# Patient Record
Sex: MALE | Race: BLACK OR AFRICAN AMERICAN | NOT HISPANIC OR LATINO | Employment: FULL TIME | URBAN - METROPOLITAN AREA
[De-identification: names, ages, dates, MRNs, and addresses within clinical notes are randomized per-mention and may not be internally consistent; named-entity substitution may affect disease eponyms.]

---

## 2023-04-08 LAB
PROSTATE SPECIFIC AG (NG/ML) IN SER/PLAS: 2.2 NG/ML (ref 0–4)
PROSTATE SPECIFIC AG FREE (NG/ML) IN SER/PLAS: 0.8 NG/ML
PROSTATE SPECIFIC AG FREE/PROSTATE SPECIFIC AG TOTAL IN SER/PLAS: 36 %

## 2023-04-17 ENCOUNTER — OFFICE VISIT (OUTPATIENT)
Dept: PRIMARY CARE | Facility: CLINIC | Age: 68
End: 2023-04-17
Payer: MEDICARE

## 2023-04-17 DIAGNOSIS — R73.03 BORDERLINE TYPE 2 DIABETES MELLITUS: Primary | ICD-10-CM

## 2023-04-17 DIAGNOSIS — I10 HYPERTENSION, UNSPECIFIED TYPE: ICD-10-CM

## 2023-04-17 DIAGNOSIS — E78.2 HYPERLIPIDEMIA, MIXED: ICD-10-CM

## 2023-04-17 PROCEDURE — 82465 ASSAY BLD/SERUM CHOLESTEROL: CPT

## 2023-04-17 PROCEDURE — 1170F FXNL STATUS ASSESSED: CPT | Performed by: INTERNAL MEDICINE

## 2023-04-17 PROCEDURE — 99213 OFFICE O/P EST LOW 20 MIN: CPT | Performed by: INTERNAL MEDICINE

## 2023-04-17 PROCEDURE — 83036 HEMOGLOBIN GLYCOSYLATED A1C: CPT

## 2023-04-17 PROCEDURE — 36415 COLL VENOUS BLD VENIPUNCTURE: CPT | Performed by: INTERNAL MEDICINE

## 2023-04-17 PROCEDURE — 1159F MED LIST DOCD IN RCRD: CPT | Performed by: INTERNAL MEDICINE

## 2023-04-17 PROCEDURE — 83718 ASSAY OF LIPOPROTEIN: CPT

## 2023-04-17 PROCEDURE — 1160F RVW MEDS BY RX/DR IN RCRD: CPT | Performed by: INTERNAL MEDICINE

## 2023-04-17 PROCEDURE — 1036F TOBACCO NON-USER: CPT | Performed by: INTERNAL MEDICINE

## 2023-04-17 PROCEDURE — 80053 COMPREHEN METABOLIC PANEL: CPT

## 2023-04-17 RX ORDER — ATORVASTATIN CALCIUM 20 MG/1
20 TABLET, FILM COATED ORAL DAILY
COMMUNITY
Start: 2022-09-29 | End: 2023-09-05

## 2023-04-17 RX ORDER — LISINOPRIL AND HYDROCHLOROTHIAZIDE 20; 25 MG/1; MG/1
1 TABLET ORAL DAILY
COMMUNITY
Start: 2016-08-11 | End: 2023-10-22

## 2023-04-17 RX ORDER — ASPIRIN 81 MG/1
81 TABLET ORAL DAILY
COMMUNITY
Start: 2022-08-03 | End: 2023-11-07

## 2023-04-17 RX ORDER — TRAVOPROST OPHTHALMIC SOLUTION 0.04 MG/ML
1 SOLUTION OPHTHALMIC NIGHTLY
COMMUNITY
Start: 2023-03-01

## 2023-04-17 SDOH — ECONOMIC STABILITY: FOOD INSECURITY: WITHIN THE PAST 12 MONTHS, THE FOOD YOU BOUGHT JUST DIDN'T LAST AND YOU DIDN'T HAVE MONEY TO GET MORE.: NEVER TRUE

## 2023-04-17 SDOH — ECONOMIC STABILITY: FOOD INSECURITY: WITHIN THE PAST 12 MONTHS, YOU WORRIED THAT YOUR FOOD WOULD RUN OUT BEFORE YOU GOT MONEY TO BUY MORE.: NEVER TRUE

## 2023-04-17 SDOH — ECONOMIC STABILITY: TRANSPORTATION INSECURITY
IN THE PAST 12 MONTHS, HAS THE LACK OF TRANSPORTATION KEPT YOU FROM MEDICAL APPOINTMENTS OR FROM GETTING MEDICATIONS?: NO

## 2023-04-17 SDOH — ECONOMIC STABILITY: INCOME INSECURITY: IN THE LAST 12 MONTHS, WAS THERE A TIME WHEN YOU WERE NOT ABLE TO PAY THE MORTGAGE OR RENT ON TIME?: NO

## 2023-04-17 SDOH — ECONOMIC STABILITY: HOUSING INSECURITY
IN THE LAST 12 MONTHS, WAS THERE A TIME WHEN YOU DID NOT HAVE A STEADY PLACE TO SLEEP OR SLEPT IN A SHELTER (INCLUDING NOW)?: NO

## 2023-04-17 SDOH — HEALTH STABILITY: PHYSICAL HEALTH: ON AVERAGE, HOW MANY DAYS PER WEEK DO YOU ENGAGE IN MODERATE TO STRENUOUS EXERCISE (LIKE A BRISK WALK)?: 3 DAYS

## 2023-04-17 SDOH — ECONOMIC STABILITY: TRANSPORTATION INSECURITY
IN THE PAST 12 MONTHS, HAS LACK OF TRANSPORTATION KEPT YOU FROM MEETINGS, WORK, OR FROM GETTING THINGS NEEDED FOR DAILY LIVING?: NO

## 2023-04-17 SDOH — HEALTH STABILITY: PHYSICAL HEALTH: ON AVERAGE, HOW MANY MINUTES DO YOU ENGAGE IN EXERCISE AT THIS LEVEL?: 40 MIN

## 2023-04-17 ASSESSMENT — ACTIVITIES OF DAILY LIVING (ADL)
WALKS IN HOME: INDEPENDENT
HEARING - LEFT EAR: FUNCTIONAL
ADEQUATE_TO_COMPLETE_ADL: YES
GROOMING: INDEPENDENT
TOILETING: INDEPENDENT
DRESSING YOURSELF: INDEPENDENT
JUDGMENT_ADEQUATE_SAFELY_COMPLETE_DAILY_ACTIVITIES: YES
PATIENT'S MEMORY ADEQUATE TO SAFELY COMPLETE DAILY ACTIVITIES?: YES
HEARING - RIGHT EAR: FUNCTIONAL
FEEDING YOURSELF: INDEPENDENT
BATHING: INDEPENDENT

## 2023-04-17 ASSESSMENT — ENCOUNTER SYMPTOMS
MYALGIAS: 0
ACTIVITY CHANGE: 0
SINUS PRESSURE: 0
WEAKNESS: 0
SHORTNESS OF BREATH: 0
PALPITATIONS: 0
CHILLS: 0
CHEST TIGHTNESS: 0
OCCASIONAL FEELINGS OF UNSTEADINESS: 0
DEPRESSION: 0
DIARRHEA: 0
AGITATION: 0
SORE THROAT: 0
LOSS OF SENSATION IN FEET: 0
NAUSEA: 0

## 2023-04-17 ASSESSMENT — ANXIETY QUESTIONNAIRES
4. TROUBLE RELAXING: NOT AT ALL
7. FEELING AFRAID AS IF SOMETHING AWFUL MIGHT HAPPEN: NOT AT ALL
5. BEING SO RESTLESS THAT IT IS HARD TO SIT STILL: NOT AT ALL
1. FEELING NERVOUS, ANXIOUS, OR ON EDGE: NOT AT ALL
IF YOU CHECKED OFF ANY PROBLEMS ON THIS QUESTIONNAIRE, HOW DIFFICULT HAVE THESE PROBLEMS MADE IT FOR YOU TO DO YOUR WORK, TAKE CARE OF THINGS AT HOME, OR GET ALONG WITH OTHER PEOPLE: NOT DIFFICULT AT ALL
GAD7 TOTAL SCORE: 0
3. WORRYING TOO MUCH ABOUT DIFFERENT THINGS: NOT AT ALL
6. BECOMING EASILY ANNOYED OR IRRITABLE: NOT AT ALL
2. NOT BEING ABLE TO STOP OR CONTROL WORRYING: NOT AT ALL

## 2023-04-17 ASSESSMENT — SOCIAL DETERMINANTS OF HEALTH (SDOH)
DO YOU BELONG TO ANY CLUBS OR ORGANIZATIONS SUCH AS CHURCH GROUPS UNIONS, FRATERNAL OR ATHLETIC GROUPS, OR SCHOOL GROUPS?: PATIENT DECLINED
HOW OFTEN DO YOU ATTENT MEETINGS OF THE CLUB OR ORGANIZATION YOU BELONG TO?: PATIENT DECLINED
HOW OFTEN DO YOU GET TOGETHER WITH FRIENDS OR RELATIVES?: THREE TIMES A WEEK
HOW HARD IS IT FOR YOU TO PAY FOR THE VERY BASICS LIKE FOOD, HOUSING, MEDICAL CARE, AND HEATING?: NOT VERY HARD
ARE YOU MARRIED, WIDOWED, DIVORCED, SEPARATED, NEVER MARRIED, OR LIVING WITH A PARTNER?: PATIENT DECLINED
WITHIN THE LAST YEAR, HAVE TO BEEN RAPED OR FORCED TO HAVE ANY KIND OF SEXUAL ACTIVITY BY YOUR PARTNER OR EX-PARTNER?: NO
WITHIN THE LAST YEAR, HAVE YOU BEEN KICKED, HIT, SLAPPED, OR OTHERWISE PHYSICALLY HURT BY YOUR PARTNER OR EX-PARTNER?: NO
WITHIN THE LAST YEAR, HAVE YOU BEEN AFRAID OF YOUR PARTNER OR EX-PARTNER?: NO
IN A TYPICAL WEEK, HOW MANY TIMES DO YOU TALK ON THE PHONE WITH FAMILY, FRIENDS, OR NEIGHBORS?: THREE TIMES A WEEK
HOW OFTEN DO YOU ATTEND CHURCH OR RELIGIOUS SERVICES?: 1 TO 4 TIMES PER YEAR
WITHIN THE LAST YEAR, HAVE YOU BEEN HUMILIATED OR EMOTIONALLY ABUSED IN OTHER WAYS BY YOUR PARTNER OR EX-PARTNER?: NO

## 2023-04-17 ASSESSMENT — LIFESTYLE VARIABLES
AUDIT-C TOTAL SCORE: 0
HOW OFTEN DO YOU HAVE SIX OR MORE DRINKS ON ONE OCCASION: NEVER
HOW OFTEN DO YOU HAVE A DRINK CONTAINING ALCOHOL: NEVER
SKIP TO QUESTIONS 9-10: 1
HOW MANY STANDARD DRINKS CONTAINING ALCOHOL DO YOU HAVE ON A TYPICAL DAY: PATIENT DOES NOT DRINK

## 2023-04-17 ASSESSMENT — COLUMBIA-SUICIDE SEVERITY RATING SCALE - C-SSRS
6. HAVE YOU EVER DONE ANYTHING, STARTED TO DO ANYTHING, OR PREPARED TO DO ANYTHING TO END YOUR LIFE?: NO
2. HAVE YOU ACTUALLY HAD ANY THOUGHTS OF KILLING YOURSELF?: NO
1. IN THE PAST MONTH, HAVE YOU WISHED YOU WERE DEAD OR WISHED YOU COULD GO TO SLEEP AND NOT WAKE UP?: NO

## 2023-04-17 ASSESSMENT — PATIENT HEALTH QUESTIONNAIRE - PHQ9
1. LITTLE INTEREST OR PLEASURE IN DOING THINGS: NOT AT ALL
SUM OF ALL RESPONSES TO PHQ9 QUESTIONS 1 & 2: 0
2. FEELING DOWN, DEPRESSED OR HOPELESS: NOT AT ALL

## 2023-04-17 ASSESSMENT — PAIN SCALES - GENERAL: PAINLEVEL: 0-NO PAIN

## 2023-04-17 NOTE — PROGRESS NOTES
Subjective   Patient ID: Ariel Howard Jr. is a 67 y.o. male who presents for Follow-up.  Feels well on the medication.  He has been told that he had borderline diabetes so he wants the HbA1c checked again.  Watching his diet closely.      HPI     Review of Systems   Constitutional:  Negative for activity change and chills.   HENT:  Negative for congestion, sinus pressure and sore throat.    Respiratory:  Negative for chest tightness and shortness of breath.    Cardiovascular:  Negative for chest pain and palpitations.   Gastrointestinal:  Negative for diarrhea and nausea.   Musculoskeletal:  Negative for myalgias.   Neurological:  Negative for weakness.   Psychiatric/Behavioral:  Negative for agitation and behavioral problems.        Objective   BP (P) 138/82 (BP Location: Left arm, Patient Position: Sitting, BP Cuff Size: Large adult)   Pulse (P) 84   Temp (P) 36.6 °C (97.9 °F) (Temporal)   Resp (P) 18   SpO2 (P) 98%     Physical Exam  Constitutional:       Appearance: Normal appearance.   HENT:      Head: Normocephalic and atraumatic.      Nose: Nose normal.      Mouth/Throat:      Mouth: Mucous membranes are moist.   Eyes:      Extraocular Movements: Extraocular movements intact.      Pupils: Pupils are equal, round, and reactive to light.   Cardiovascular:      Rate and Rhythm: Normal rate and regular rhythm.   Pulmonary:      Effort: No respiratory distress.      Breath sounds: No wheezing.   Abdominal:      General: Bowel sounds are normal.      Palpations: Abdomen is soft.   Neurological:      General: No focal deficit present.       Assessment/Plan   Problem List Items Addressed This Visit          Circulatory    Hypertension    Relevant Orders    Comprehensive Metabolic Panel       Other    Hyperlipidemia, mixed    Relevant Orders    Lipid Panel Non-Fasting     Other Visit Diagnoses       Borderline type 2 diabetes mellitus    -  Primary    Relevant Orders    Hemoglobin A1C    Comprehensive  Metabolic Panel        Doing well overall.       Dictation #1  MRN:[id number]  CSN:[id number]

## 2023-04-18 LAB
ALANINE AMINOTRANSFERASE (SGPT) (U/L) IN SER/PLAS: 9 U/L (ref 10–52)
ALBUMIN (G/DL) IN SER/PLAS: 4.1 G/DL (ref 3.4–5)
ALKALINE PHOSPHATASE (U/L) IN SER/PLAS: 51 U/L (ref 33–136)
ANION GAP IN SER/PLAS: 13 MMOL/L (ref 10–20)
ASPARTATE AMINOTRANSFERASE (SGOT) (U/L) IN SER/PLAS: 14 U/L (ref 9–39)
BILIRUBIN TOTAL (MG/DL) IN SER/PLAS: 0.5 MG/DL (ref 0–1.2)
CALCIUM (MG/DL) IN SER/PLAS: 9.6 MG/DL (ref 8.6–10.6)
CARBON DIOXIDE, TOTAL (MMOL/L) IN SER/PLAS: 27 MMOL/L (ref 21–32)
CHLORIDE (MMOL/L) IN SER/PLAS: 105 MMOL/L (ref 98–107)
CHOLESTEROL (MG/DL) IN SER/PLAS: 129 MG/DL (ref 0–199)
CHOLESTEROL IN HDL (MG/DL) IN SER/PLAS: 31.3 MG/DL
CHOLESTEROL/HDL RATIO: 4.1
CREATININE (MG/DL) IN SER/PLAS: 1.2 MG/DL (ref 0.5–1.3)
ESTIMATED AVERAGE GLUCOSE FOR HBA1C: 120 MG/DL
GFR MALE: 66 ML/MIN/1.73M2
GLUCOSE (MG/DL) IN SER/PLAS: 99 MG/DL (ref 74–99)
HEMOGLOBIN A1C/HEMOGLOBIN TOTAL IN BLOOD: 5.8 %
NON-HDL CHOLESTEROL: 98 MG/DL
POTASSIUM (MMOL/L) IN SER/PLAS: 3.5 MMOL/L (ref 3.5–5.3)
PROTEIN TOTAL: 7.2 G/DL (ref 6.4–8.2)
SODIUM (MMOL/L) IN SER/PLAS: 141 MMOL/L (ref 136–145)
UREA NITROGEN (MG/DL) IN SER/PLAS: 18 MG/DL (ref 6–23)

## 2023-09-03 DIAGNOSIS — E78.5 HYPERLIPIDEMIA, UNSPECIFIED: ICD-10-CM

## 2023-09-05 RX ORDER — ATORVASTATIN CALCIUM 20 MG/1
20 TABLET, FILM COATED ORAL DAILY
Qty: 90 TABLET | Refills: 3 | Status: SHIPPED | OUTPATIENT
Start: 2023-09-05

## 2023-09-29 ENCOUNTER — TELEPHONE (OUTPATIENT)
Dept: PRIMARY CARE | Facility: CLINIC | Age: 68
End: 2023-09-29
Payer: MEDICARE

## 2023-09-29 DIAGNOSIS — Z12.11 COLON CANCER SCREENING: Primary | ICD-10-CM

## 2023-09-29 NOTE — TELEPHONE ENCOUNTER
Patient states he needs a referral for 10 year colon screening. Please put referral in for GI consult.

## 2023-10-12 ENCOUNTER — LAB (OUTPATIENT)
Dept: LAB | Facility: LAB | Age: 68
End: 2023-10-12
Payer: MEDICARE

## 2023-10-12 DIAGNOSIS — R97.20 ELEVATED PROSTATE SPECIFIC ANTIGEN (PSA): Primary | ICD-10-CM

## 2023-10-12 DIAGNOSIS — R97.20 ELEVATED PROSTATE SPECIFIC ANTIGEN (PSA): ICD-10-CM

## 2023-10-12 LAB — PSA SERPL-MCNC: 1.81 NG/ML

## 2023-10-12 PROCEDURE — 84153 ASSAY OF PSA TOTAL: CPT

## 2023-10-12 PROCEDURE — 36415 COLL VENOUS BLD VENIPUNCTURE: CPT

## 2023-10-20 DIAGNOSIS — I10 ESSENTIAL (PRIMARY) HYPERTENSION: ICD-10-CM

## 2023-10-22 RX ORDER — LISINOPRIL AND HYDROCHLOROTHIAZIDE 20; 25 MG/1; MG/1
1 TABLET ORAL DAILY
Qty: 90 TABLET | Refills: 3 | Status: SHIPPED | OUTPATIENT
Start: 2023-10-22

## 2023-10-25 PROBLEM — L98.9 SKIN LESION: Status: ACTIVE | Noted: 2023-10-25

## 2023-10-25 PROBLEM — R73.9 HYPERGLYCEMIA: Status: ACTIVE | Noted: 2023-10-25

## 2023-10-25 PROBLEM — U07.1 DISEASE DUE TO SEVERE ACUTE RESPIRATORY SYNDROME CORONAVIRUS 2 (SARS-COV-2): Status: ACTIVE | Noted: 2023-10-25

## 2023-10-25 PROBLEM — D17.30 LIPOMA OF SKIN: Status: ACTIVE | Noted: 2023-10-25

## 2023-10-25 PROBLEM — R06.09 EXERTIONAL DYSPNEA: Status: ACTIVE | Noted: 2023-10-25

## 2023-10-25 PROBLEM — R94.31 ABNORMAL EKG: Status: ACTIVE | Noted: 2023-10-25

## 2023-10-25 PROBLEM — E87.6 HYPOKALEMIA: Status: ACTIVE | Noted: 2023-10-25

## 2023-10-25 PROBLEM — D17.1 LIPOMA OF TORSO: Status: ACTIVE | Noted: 2023-10-25

## 2023-10-25 PROBLEM — L30.9 ECZEMA: Status: ACTIVE | Noted: 2023-10-25

## 2023-10-25 PROBLEM — R97.20 ELEVATED PSA: Status: ACTIVE | Noted: 2023-10-25

## 2023-10-25 PROBLEM — I10 BENIGN ESSENTIAL HYPERTENSION: Status: ACTIVE | Noted: 2023-10-25

## 2023-10-25 PROBLEM — E78.5 DYSLIPIDEMIA: Status: ACTIVE | Noted: 2023-10-25

## 2023-10-25 PROBLEM — H61.23 EXCESSIVE CERUMEN IN BOTH EAR CANALS: Status: ACTIVE | Noted: 2023-10-25

## 2023-10-25 PROBLEM — E55.9 VITAMIN D DEFICIENCY: Status: ACTIVE | Noted: 2023-10-25

## 2023-10-25 PROBLEM — B02.9 SHINGLES: Status: ACTIVE | Noted: 2023-10-25

## 2023-10-25 PROBLEM — L85.3 DRY SKIN: Status: ACTIVE | Noted: 2023-10-25

## 2023-10-25 PROBLEM — R55 VASOVAGAL SYNCOPE: Status: ACTIVE | Noted: 2023-10-25

## 2023-10-25 PROBLEM — R39.89 ABNORMAL COMPLIANCE OF BLADDER: Status: ACTIVE | Noted: 2023-10-25

## 2023-10-26 ENCOUNTER — OFFICE VISIT (OUTPATIENT)
Dept: UROLOGY | Facility: HOSPITAL | Age: 68
End: 2023-10-26
Payer: MEDICARE

## 2023-10-26 DIAGNOSIS — R97.20 ELEVATED PSA: ICD-10-CM

## 2023-10-26 DIAGNOSIS — R39.89 ABNORMAL COMPLIANCE OF BLADDER: Primary | ICD-10-CM

## 2023-10-26 PROCEDURE — 1159F MED LIST DOCD IN RCRD: CPT | Performed by: UROLOGY

## 2023-10-26 PROCEDURE — 1036F TOBACCO NON-USER: CPT | Performed by: UROLOGY

## 2023-10-26 PROCEDURE — 1126F AMNT PAIN NOTED NONE PRSNT: CPT | Performed by: UROLOGY

## 2023-10-26 PROCEDURE — 1160F RVW MEDS BY RX/DR IN RCRD: CPT | Performed by: UROLOGY

## 2023-10-26 PROCEDURE — 99213 OFFICE O/P EST LOW 20 MIN: CPT | Performed by: UROLOGY

## 2023-10-26 NOTE — PROGRESS NOTES
Patient:   Ariel Howard Jr. is a 68 y.o. year old male patient here today for follow up and PSA results. Pt is unsure of PFHx.      Hx:   - Elevated PSA  - MRI on 8/5/2022 demonstrated an intermediate PI-RADS 3 lesion    Recent PSA and PSA panel:  - PSA was 1.81 on 10/12/23  - 2.2 total with 36% free on 4/5/23  - 2.0 total with 40% free on 9/6/22  PVR is 5 cc  - Results reviewed with patient. Patient reassured.     Patient concerns and symptoms:  Doing well, no complaints. Denies any dysuria, hematuria, bothersome urinary frequency, urgency, or flank pain. Patient denies any pushing or straining to urinate. He feels he is fully emptying his bladder.    Plan:  - Follow up in 6 months with PSA panel prior.   - All questions and concerns were addressed. Patient verbalizes understanding and has no other questions at this time.     Scribe Attestation:  By signing my name below, IMeagan Scribe attest that this documentation has been prepared under the direction and in the presence of Oumar Martinez MD.

## 2023-10-31 ENCOUNTER — OFFICE VISIT (OUTPATIENT)
Dept: PRIMARY CARE | Facility: CLINIC | Age: 68
End: 2023-10-31
Payer: MEDICARE

## 2023-10-31 ENCOUNTER — APPOINTMENT (OUTPATIENT)
Dept: GASTROENTEROLOGY | Facility: CLINIC | Age: 68
End: 2023-10-31
Payer: MEDICARE

## 2023-10-31 VITALS
DIASTOLIC BLOOD PRESSURE: 75 MMHG | RESPIRATION RATE: 18 BRPM | WEIGHT: 215.6 LBS | HEART RATE: 91 BPM | TEMPERATURE: 97.6 F | SYSTOLIC BLOOD PRESSURE: 147 MMHG | BODY MASS INDEX: 29.2 KG/M2 | HEIGHT: 72 IN | OXYGEN SATURATION: 98 %

## 2023-10-31 DIAGNOSIS — I10 PRIMARY HYPERTENSION: ICD-10-CM

## 2023-10-31 DIAGNOSIS — Z23 NEED FOR VACCINATION: ICD-10-CM

## 2023-10-31 DIAGNOSIS — Z12.11 COLON CANCER SCREENING: ICD-10-CM

## 2023-10-31 DIAGNOSIS — Z00.00 HEALTHCARE MAINTENANCE: Primary | ICD-10-CM

## 2023-10-31 PROCEDURE — 1170F FXNL STATUS ASSESSED: CPT | Performed by: INTERNAL MEDICINE

## 2023-10-31 PROCEDURE — 36415 COLL VENOUS BLD VENIPUNCTURE: CPT

## 2023-10-31 PROCEDURE — 3077F SYST BP >= 140 MM HG: CPT | Performed by: INTERNAL MEDICINE

## 2023-10-31 PROCEDURE — G0439 PPPS, SUBSEQ VISIT: HCPCS | Performed by: INTERNAL MEDICINE

## 2023-10-31 PROCEDURE — 80053 COMPREHEN METABOLIC PANEL: CPT

## 2023-10-31 PROCEDURE — 82465 ASSAY BLD/SERUM CHOLESTEROL: CPT

## 2023-10-31 PROCEDURE — G0008 ADMIN INFLUENZA VIRUS VAC: HCPCS | Performed by: INTERNAL MEDICINE

## 2023-10-31 PROCEDURE — 90662 IIV NO PRSV INCREASED AG IM: CPT | Performed by: INTERNAL MEDICINE

## 2023-10-31 PROCEDURE — 86803 HEPATITIS C AB TEST: CPT

## 2023-10-31 PROCEDURE — 83718 ASSAY OF LIPOPROTEIN: CPT

## 2023-10-31 PROCEDURE — 3078F DIAST BP <80 MM HG: CPT | Performed by: INTERNAL MEDICINE

## 2023-10-31 PROCEDURE — 1160F RVW MEDS BY RX/DR IN RCRD: CPT | Performed by: INTERNAL MEDICINE

## 2023-10-31 PROCEDURE — 83036 HEMOGLOBIN GLYCOSYLATED A1C: CPT

## 2023-10-31 PROCEDURE — 1036F TOBACCO NON-USER: CPT | Performed by: INTERNAL MEDICINE

## 2023-10-31 PROCEDURE — 1159F MED LIST DOCD IN RCRD: CPT | Performed by: INTERNAL MEDICINE

## 2023-10-31 PROCEDURE — 1126F AMNT PAIN NOTED NONE PRSNT: CPT | Performed by: INTERNAL MEDICINE

## 2023-10-31 ASSESSMENT — ACTIVITIES OF DAILY LIVING (ADL)
FEEDING YOURSELF: INDEPENDENT
PATIENT'S MEMORY ADEQUATE TO SAFELY COMPLETE DAILY ACTIVITIES?: YES
DRESSING YOURSELF: INDEPENDENT
DOING_HOUSEWORK: INDEPENDENT
HEARING - RIGHT EAR: FUNCTIONAL
GROCERY_SHOPPING: INDEPENDENT
JUDGMENT_ADEQUATE_SAFELY_COMPLETE_DAILY_ACTIVITIES: YES
GROOMING: INDEPENDENT
ADEQUATE_TO_COMPLETE_ADL: YES
TAKING_MEDICATION: INDEPENDENT
WALKS IN HOME: INDEPENDENT
MANAGING_FINANCES: INDEPENDENT
TOILETING: INDEPENDENT
BATHING: INDEPENDENT
HEARING - LEFT EAR: FUNCTIONAL

## 2023-10-31 ASSESSMENT — ENCOUNTER SYMPTOMS
MYALGIAS: 0
SINUS PRESSURE: 0
CHEST TIGHTNESS: 0
LOSS OF SENSATION IN FEET: 0
SHORTNESS OF BREATH: 0
WEAKNESS: 0
DEPRESSION: 0
NAUSEA: 0
AGITATION: 0
OCCASIONAL FEELINGS OF UNSTEADINESS: 0
CHILLS: 0
DIARRHEA: 0
ACTIVITY CHANGE: 0
SORE THROAT: 0
PALPITATIONS: 0

## 2023-10-31 ASSESSMENT — PATIENT HEALTH QUESTIONNAIRE - PHQ9
SUM OF ALL RESPONSES TO PHQ9 QUESTIONS 1 AND 2: 0
1. LITTLE INTEREST OR PLEASURE IN DOING THINGS: NOT AT ALL
2. FEELING DOWN, DEPRESSED OR HOPELESS: NOT AT ALL

## 2023-10-31 ASSESSMENT — PAIN SCALES - GENERAL: PAINLEVEL: 0-NO PAIN

## 2023-10-31 ASSESSMENT — COLUMBIA-SUICIDE SEVERITY RATING SCALE - C-SSRS
2. HAVE YOU ACTUALLY HAD ANY THOUGHTS OF KILLING YOURSELF?: NO
6. HAVE YOU EVER DONE ANYTHING, STARTED TO DO ANYTHING, OR PREPARED TO DO ANYTHING TO END YOUR LIFE?: NO
1. IN THE PAST MONTH, HAVE YOU WISHED YOU WERE DEAD OR WISHED YOU COULD GO TO SLEEP AND NOT WAKE UP?: NO

## 2023-10-31 NOTE — PROGRESS NOTES
Subjective   Reason for Visit: Ariel Howard Jr. is an 68 y.o. male here for a Medicare Wellness visit.  He senses a fullness below his scrotum.  He denies any problems with his urinary stream, fever, or any changes in his scrotum.  He simply feels a line between his rectum and his testicles.       Reviewed all medications by prescribing practitioner or clinical pharmacist (such as prescriptions, OTCs, herbal therapies and supplements) and documented in the medical record.    HPI    Patient Care Team:  Harjinder Keller MD as PCP - General (Internal Medicine)  EMERSON Frye as PCP - [employer] PCP     Review of Systems   Constitutional:  Negative for activity change and chills.   HENT:  Negative for congestion, sinus pressure and sore throat.    Respiratory:  Negative for chest tightness and shortness of breath.    Cardiovascular:  Negative for chest pain and palpitations.   Gastrointestinal:  Negative for diarrhea and nausea.   Musculoskeletal:  Negative for myalgias.   Neurological:  Negative for weakness.   Psychiatric/Behavioral:  Negative for agitation and behavioral problems.        Objective   Vitals:  /75 (BP Location: Left arm, Patient Position: Sitting, BP Cuff Size: Large adult)   Pulse 91   Temp 36.4 °C (97.6 °F) (Temporal)   Resp 18   Ht 1.829 m (6')   Wt 97.8 kg (215 lb 9.6 oz)   SpO2 98%   BMI 29.24 kg/m²       Physical Exam  Constitutional:       Appearance: Normal appearance.   HENT:      Head: Normocephalic and atraumatic.      Nose: Nose normal.      Mouth/Throat:      Mouth: Mucous membranes are moist.   Eyes:      Extraocular Movements: Extraocular movements intact.      Pupils: Pupils are equal, round, and reactive to light.   Cardiovascular:      Rate and Rhythm: Normal rate and regular rhythm.   Pulmonary:      Effort: No respiratory distress.      Breath sounds: No wheezing.   Abdominal:      General: Bowel sounds are normal.      Palpations: Abdomen is  soft.   Genitourinary:     Penis: Normal.       Testes: Normal.      Comments: No abnormalities.  Neurological:      General: No focal deficit present.   Psychiatric:         Mood and Affect: Mood normal.         Behavior: Behavior normal.         Assessment/Plan   Problem List Items Addressed This Visit       Hypertension    Relevant Orders    Comprehensive Metabolic Panel (Completed)    Lipid Panel Non-Fasting (Completed)    Healthcare maintenance - Primary    Relevant Orders    Hepatitis C Antibody (Completed)    Flu vaccine, quadrivalent, high-dose, preservative free, age 65y+ (FLUZONE) (Completed)    Hemoglobin A1C (Completed)     Other Visit Diagnoses       Colon cancer screening        Relevant Orders    Referral to Gastroenterology    Need for vaccination        Relevant Orders    Flu vaccine, quadrivalent, high-dose, preservative free, age 65y+ (FLUZONE) (Completed)        There is nothing to feel on that exam.  The patient was reassured.

## 2023-11-01 LAB
ALBUMIN SERPL BCP-MCNC: 4.4 G/DL (ref 3.4–5)
ALP SERPL-CCNC: 49 U/L (ref 33–136)
ALT SERPL W P-5'-P-CCNC: 10 U/L (ref 10–52)
ANION GAP SERPL CALC-SCNC: 13 MMOL/L (ref 10–20)
AST SERPL W P-5'-P-CCNC: 15 U/L (ref 9–39)
BILIRUB SERPL-MCNC: 0.7 MG/DL (ref 0–1.2)
BUN SERPL-MCNC: 13 MG/DL (ref 6–23)
CALCIUM SERPL-MCNC: 9.6 MG/DL (ref 8.6–10.6)
CHLORIDE SERPL-SCNC: 103 MMOL/L (ref 98–107)
CHOLEST SERPL-MCNC: 131 MG/DL (ref 0–199)
CHOLESTEROL/HDL RATIO: 4
CO2 SERPL-SCNC: 29 MMOL/L (ref 21–32)
CREAT SERPL-MCNC: 1.27 MG/DL (ref 0.5–1.3)
EST. AVERAGE GLUCOSE BLD GHB EST-MCNC: 114 MG/DL
GFR SERPL CREATININE-BSD FRML MDRD: 62 ML/MIN/1.73M*2
GLUCOSE SERPL-MCNC: 94 MG/DL (ref 74–99)
HBA1C MFR BLD: 5.6 %
HCV AB SER QL: NONREACTIVE
HDLC SERPL-MCNC: 32.5 MG/DL
NON-HDL CHOLESTEROL: 99 MG/DL (ref 0–149)
POTASSIUM SERPL-SCNC: 3.6 MMOL/L (ref 3.5–5.3)
PROT SERPL-MCNC: 7.5 G/DL (ref 6.4–8.2)
SODIUM SERPL-SCNC: 141 MMOL/L (ref 136–145)

## 2023-11-01 ASSESSMENT — PATIENT HEALTH QUESTIONNAIRE - PHQ9
2. FEELING DOWN, DEPRESSED OR HOPELESS: NOT AT ALL
1. LITTLE INTEREST OR PLEASURE IN DOING THINGS: NOT AT ALL
SUM OF ALL RESPONSES TO PHQ9 QUESTIONS 1 AND 2: 0

## 2023-11-01 ASSESSMENT — ACTIVITIES OF DAILY LIVING (ADL)
TAKING_MEDICATION: INDEPENDENT
DOING_HOUSEWORK: INDEPENDENT
DRESSING: INDEPENDENT
GROCERY_SHOPPING: INDEPENDENT
BATHING: INDEPENDENT
MANAGING_FINANCES: INDEPENDENT

## 2023-11-07 DIAGNOSIS — E78.5 HYPERLIPIDEMIA, UNSPECIFIED: ICD-10-CM

## 2023-11-07 RX ORDER — ASPIRIN 81 MG/1
81 TABLET ORAL DAILY
Qty: 90 TABLET | Refills: 3 | Status: SHIPPED | OUTPATIENT
Start: 2023-11-07

## 2023-11-17 ENCOUNTER — APPOINTMENT (OUTPATIENT)
Dept: GASTROENTEROLOGY | Facility: CLINIC | Age: 68
End: 2023-11-17
Payer: MEDICARE

## 2023-12-04 ENCOUNTER — OFFICE VISIT (OUTPATIENT)
Dept: GASTROENTEROLOGY | Facility: CLINIC | Age: 68
End: 2023-12-04
Payer: MEDICARE

## 2023-12-04 VITALS
WEIGHT: 215.28 LBS | TEMPERATURE: 97.5 F | BODY MASS INDEX: 29.2 KG/M2 | RESPIRATION RATE: 18 BRPM | SYSTOLIC BLOOD PRESSURE: 134 MMHG | HEART RATE: 97 BPM | DIASTOLIC BLOOD PRESSURE: 85 MMHG | OXYGEN SATURATION: 98 %

## 2023-12-04 DIAGNOSIS — Z12.11 COLON CANCER SCREENING: ICD-10-CM

## 2023-12-04 DIAGNOSIS — Z12.11 ENCOUNTER FOR SCREENING COLONOSCOPY: Primary | ICD-10-CM

## 2023-12-04 PROCEDURE — 3079F DIAST BP 80-89 MM HG: CPT | Performed by: NURSE PRACTITIONER

## 2023-12-04 PROCEDURE — 1126F AMNT PAIN NOTED NONE PRSNT: CPT | Performed by: NURSE PRACTITIONER

## 2023-12-04 PROCEDURE — 3075F SYST BP GE 130 - 139MM HG: CPT | Performed by: NURSE PRACTITIONER

## 2023-12-04 PROCEDURE — 1159F MED LIST DOCD IN RCRD: CPT | Performed by: NURSE PRACTITIONER

## 2023-12-04 PROCEDURE — 99204 OFFICE O/P NEW MOD 45 MIN: CPT | Performed by: NURSE PRACTITIONER

## 2023-12-04 PROCEDURE — 1036F TOBACCO NON-USER: CPT | Performed by: NURSE PRACTITIONER

## 2023-12-04 PROCEDURE — 1160F RVW MEDS BY RX/DR IN RCRD: CPT | Performed by: NURSE PRACTITIONER

## 2023-12-04 RX ORDER — POLYETHYLENE GLYCOL 3350 17 G/17G
238 POWDER, FOR SOLUTION ORAL ONCE
Qty: 238 G | Refills: 0 | Status: SHIPPED | OUTPATIENT
Start: 2023-12-04 | End: 2023-12-04

## 2023-12-04 ASSESSMENT — ENCOUNTER SYMPTOMS
PALPITATIONS: 0
LIGHT-HEADEDNESS: 0
DYSURIA: 0
SHORTNESS OF BREATH: 0
FREQUENCY: 0
CHILLS: 0
FEVER: 0
SORE THROAT: 0
HEADACHES: 0
PHOTOPHOBIA: 0
BACK PAIN: 0
DIZZINESS: 0
NUMBNESS: 0
FATIGUE: 0
NERVOUS/ANXIOUS: 0
AGITATION: 0
JOINT SWELLING: 0
ARTHRALGIAS: 0
WHEEZING: 0
DIAPHORESIS: 0
EYE PAIN: 0
HALLUCINATIONS: 0
FLANK PAIN: 0
ADENOPATHY: 0
COUGH: 0
WEAKNESS: 0
HEMATURIA: 0
MYALGIAS: 0

## 2023-12-04 NOTE — PATIENT INSTRUCTIONS
Thanks for coming to the GI clinic.     You are due for a screening colonoscopy. Please call to schedule.   Please read all of the instructions 7 days before your colonoscopy.   You will need to take ONLY clear liquids the ENTIRE day before your procedure. These include (clear fruit juices, soda, Gatorade, broth, jello and coffee/tea) Avoid red and purple drinks. No cream or milk in the coffee.   You will need to take a bowel preparation.   You will also need a .     Follow up will be based upon the above

## 2023-12-04 NOTE — PROGRESS NOTES
Subjective   Patient ID: Ariel Howard Jr. is a 68 y.o. male who presents for colonoscopy consult.     This is a  68 year old AAM with history of HTN and HLD who is presenting to the GI clinic for an initial visit.     History per pt and review of EMR    He is here to discuss having a colonoscopy done.     He feels well from a GI perspective.     Denies unintentional weight loss, abdominal pain, change in bowel habits, diarrhea, constipation, hematemesis, hematochezia, or melena.     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 Dorado : sigmoid colon diverticulosis and internal hemorrhoids         Review of Systems   Constitutional:  Negative for chills, diaphoresis, fatigue and fever.   HENT:  Negative for congestion, ear pain, hearing loss, sneezing and sore throat.    Eyes:  Negative for photophobia, pain and visual disturbance.   Respiratory:  Negative for cough, shortness of breath and wheezing.    Cardiovascular:  Negative for chest pain, palpitations and leg swelling.   Endocrine: Negative for cold intolerance and heat intolerance.   Genitourinary:  Negative for dysuria, flank pain, frequency and hematuria.   Musculoskeletal:  Negative for arthralgias, back pain, gait problem, joint swelling and myalgias.   Skin:  Negative for rash.   Neurological:  Negative for dizziness, syncope, weakness, light-headedness, numbness and headaches.   Hematological:  Negative for adenopathy.   Psychiatric/Behavioral:  Negative for agitation and hallucinations. The patient is not nervous/anxious.      /85   Pulse 97   Temp 36.4 °C (97.5 °F)   Resp 18   Wt 97.7 kg (215 lb 4.5 oz)   SpO2 98%   BMI 29.20 kg/m²      Objective     No Known Allergies    Current Outpatient Medications   Medication Sig Dispense Refill     aspirin 81 mg EC tablet TAKE 1 TABLET BY MOUTH EVERY DAY 90 tablet 3    atorvastatin (Lipitor) 20 mg tablet TAKE 1 TABLET BY MOUTH EVERY DAY 90 tablet 3    lisinopriL-hydrochlorothiazide 20-25 mg tablet TAKE 1 TABLET BY MOUTH EVERY DAY 90 tablet 3    polyethylene glycol (Glycolax, Miralax) 17 gram/dose powder Take 238 g by mouth 1 time for 1 dose. Use as directed by  Endoscopy Department for colonoscopy 238 g 0    travoprost (Travatan Z) 0.004 % drops ophthalmic solution Administer 1 drop into both eyes once daily at bedtime.       No current facility-administered medications for this visit.        Physical Exam  Constitutional:       General: He is not in acute distress.     Appearance: Normal appearance.   HENT:      Head: Normocephalic and atraumatic.   Eyes:      Conjunctiva/sclera: Conjunctivae normal.   Cardiovascular:      Rate and Rhythm: Normal rate and regular rhythm.      Heart sounds: No murmur heard.     No gallop.   Pulmonary:      Effort: Pulmonary effort is normal.      Breath sounds: Normal breath sounds.   Abdominal:      General: Bowel sounds are normal. There is no distension.      Tenderness: There is no abdominal tenderness. There is no guarding.   Musculoskeletal:         General: No swelling or deformity. Normal range of motion.      Cervical back: Normal range of motion. No rigidity.   Skin:     General: Skin is warm and dry.      Coloration: Skin is not jaundiced.      Findings: No lesion or rash.   Neurological:      General: No focal deficit present.      Mental Status: He is alert and oriented to person, place, and time.   Psychiatric:         Mood and Affect: Mood normal.         Assessment/Plan   Problem List Items Addressed This Visit    None  Visit Diagnoses       Encounter for screening colonoscopy    -  Primary    Relevant Medications    polyethylene glycol (Glycolax, Miralax) 17 gram/dose powder    Other Relevant Orders    Colonoscopy Screening; Average Risk Patient    Colon  cancer screening               Colorectal cancer screening:  - will proceed with screening colonoscopy   - Miralax Gatorade split bowel prep     2. Follow up:  - will be based upon the above

## 2024-01-22 ENCOUNTER — OFFICE VISIT (OUTPATIENT)
Dept: PRIMARY CARE | Facility: HOSPITAL | Age: 69
End: 2024-01-22
Payer: MEDICARE

## 2024-01-22 VITALS
RESPIRATION RATE: 18 BRPM | DIASTOLIC BLOOD PRESSURE: 86 MMHG | BODY MASS INDEX: 29.26 KG/M2 | OXYGEN SATURATION: 98 % | HEIGHT: 72 IN | HEART RATE: 87 BPM | TEMPERATURE: 97.5 F | WEIGHT: 216 LBS | SYSTOLIC BLOOD PRESSURE: 145 MMHG

## 2024-01-22 DIAGNOSIS — B02.9 HERPES ZOSTER WITHOUT COMPLICATION: Primary | ICD-10-CM

## 2024-01-22 DIAGNOSIS — Z12.83 SKIN CANCER SCREENING: ICD-10-CM

## 2024-01-22 PROCEDURE — 1126F AMNT PAIN NOTED NONE PRSNT: CPT | Performed by: INTERNAL MEDICINE

## 2024-01-22 PROCEDURE — 1159F MED LIST DOCD IN RCRD: CPT | Performed by: INTERNAL MEDICINE

## 2024-01-22 PROCEDURE — 3079F DIAST BP 80-89 MM HG: CPT | Performed by: INTERNAL MEDICINE

## 2024-01-22 PROCEDURE — 1160F RVW MEDS BY RX/DR IN RCRD: CPT | Performed by: INTERNAL MEDICINE

## 2024-01-22 PROCEDURE — 3077F SYST BP >= 140 MM HG: CPT | Performed by: INTERNAL MEDICINE

## 2024-01-22 PROCEDURE — 99212 OFFICE O/P EST SF 10 MIN: CPT | Performed by: INTERNAL MEDICINE

## 2024-01-22 PROCEDURE — 1036F TOBACCO NON-USER: CPT | Performed by: INTERNAL MEDICINE

## 2024-01-22 RX ORDER — POLYETHYLENE GLYCOL 3350 17 G/17G
17 POWDER, FOR SOLUTION ORAL ONCE
COMMUNITY
Start: 2023-12-04

## 2024-01-22 ASSESSMENT — ENCOUNTER SYMPTOMS
SORE THROAT: 0
AGITATION: 0
WEAKNESS: 0
PALPITATIONS: 0
CHEST TIGHTNESS: 0
SHORTNESS OF BREATH: 0
MYALGIAS: 0
NAUSEA: 0
CHILLS: 0
SINUS PRESSURE: 0
ACTIVITY CHANGE: 0
DIARRHEA: 0

## 2024-01-22 ASSESSMENT — PAIN SCALES - GENERAL: PAINLEVEL: 0-NO PAIN

## 2024-01-22 NOTE — PROGRESS NOTES
Subjective   Patient ID: Ariel Howard Jr. is a 68 y.o. male who presents for RASH . Left sided rash on his lower abdomin.  It started 2 weeks ago and and progressed and not pain as before.  No pain.     HPI     Review of Systems   Constitutional:  Negative for activity change and chills.   HENT:  Negative for congestion, sinus pressure and sore throat.    Respiratory:  Negative for chest tightness and shortness of breath.    Cardiovascular:  Negative for chest pain and palpitations.   Gastrointestinal:  Negative for diarrhea and nausea.   Musculoskeletal:  Negative for myalgias.   Skin:  Positive for rash.   Neurological:  Negative for weakness.   Psychiatric/Behavioral:  Negative for agitation and behavioral problems.        Objective   /86 (BP Location: Left arm, Patient Position: Sitting, BP Cuff Size: Adult long)   Pulse 87   Temp 36.4 °C (97.5 °F) (Temporal)   Resp 18   Ht 1.829 m (6')   Wt 98 kg (216 lb)   SpO2 98%   BMI 29.29 kg/m²     Physical Exam  Constitutional:       Appearance: Normal appearance.   HENT:      Head: Normocephalic and atraumatic.      Nose: Nose normal.      Mouth/Throat:      Mouth: Mucous membranes are moist.   Eyes:      Extraocular Movements: Extraocular movements intact.      Pupils: Pupils are equal, round, and reactive to light.   Cardiovascular:      Rate and Rhythm: Normal rate and regular rhythm.   Pulmonary:      Effort: No respiratory distress.      Breath sounds: No wheezing.   Abdominal:      General: Bowel sounds are normal.      Palpations: Abdomen is soft.   Skin:     Findings: Erythema and rash present.      Comments: He also has small round black macular lesions on his head.   Neurological:      General: No focal deficit present.         Assessment/Plan   Problem List Items Addressed This Visit             ICD-10-CM    Shingles - Primary B02.9    Skin cancer screening Z12.83    Relevant Orders    Referral to Dermatology     Too late for medication and  he had not yet got the vaccine. We will send to dermatology for a skin check of the small black lesions (that look benign to me).

## 2024-02-05 ENCOUNTER — APPOINTMENT (OUTPATIENT)
Dept: DERMATOLOGY | Facility: CLINIC | Age: 69
End: 2024-02-05
Payer: MEDICARE

## 2024-02-12 ENCOUNTER — OFFICE VISIT (OUTPATIENT)
Dept: DERMATOLOGY | Facility: CLINIC | Age: 69
End: 2024-02-12
Payer: MEDICARE

## 2024-02-12 DIAGNOSIS — L91.0 KELOID: ICD-10-CM

## 2024-02-12 DIAGNOSIS — D22.9 ATYPICAL NEVUS: Primary | ICD-10-CM

## 2024-02-12 DIAGNOSIS — L81.0 POST-INFLAMMATORY HYPERPIGMENTATION: ICD-10-CM

## 2024-02-12 PROCEDURE — 1036F TOBACCO NON-USER: CPT

## 2024-02-12 PROCEDURE — 1160F RVW MEDS BY RX/DR IN RCRD: CPT

## 2024-02-12 PROCEDURE — 1159F MED LIST DOCD IN RCRD: CPT

## 2024-02-12 PROCEDURE — 1126F AMNT PAIN NOTED NONE PRSNT: CPT

## 2024-02-12 PROCEDURE — 99204 OFFICE O/P NEW MOD 45 MIN: CPT

## 2024-02-12 PROCEDURE — 88305 TISSUE EXAM BY PATHOLOGIST: CPT | Performed by: DERMATOLOGY

## 2024-02-12 PROCEDURE — 11306 SHAVE SKIN LESION 0.6-1.0 CM: CPT

## 2024-02-12 NOTE — PROGRESS NOTES
Scalp  -noticed things over the last year  -started out as a blemish  -as it healed left a black jeremie  -now raised a little bit   -bald since the pandemic  -no pruritic   -no bleeding    Shingles  -healed    Keloid  -started as blemish  -healed as keloid  -present couple of years    Subjective   HPI: Ariel Howard Jr. is a 68 y.o. male is a new patient here for evaluation and treatment of a couple moles on the scalp, hyperpigmentation where he previously had shingles a couple of weeks ago, and 2 keloids on the chest and abdomen.     ROS: No other skin or systemic complaints other than what is documented elsewhere in the note.    ALLERGIES: Patient has no known allergies.    SOCIAL:  reports that he has never smoked. He has never used smokeless tobacco. He reports that he does not drink alcohol and does not use drugs.    Objective   Left Parietal Scalp  0.7 cm dark brown raised papule    Left Abdomen (side) - Upper, Right Breast  Firm hyperpigmented dermal papules/plaques c/w keloid      Left Abdomen (side) - Upper  Irregular bordered following a dermatome hyperpigmentation         Assessment/Plan   1. Atypical nevus  Left Parietal Scalp    This nevus is significantly larger than the rest.  With a history of it starting off as a nonhealing pimple I recommended shave removal.  Upon shave removal pigment deposition was still seen.  Discussed with patient that this can happen sometimes.  Recommended waiting for biopsy results to see if further excision is needed.  Discussed wound care with patient including applying topical bacitracin or Polysporin for the next 5 days.    Differential includes atypical nevus versus ISK.    Shave removal - Left Parietal Scalp    Specimen 1 - Dermatopathology- DERM LAB  Differential Diagnosis: atypical nevus   Check Margins Yes/No?:    Comments:    Dermpath Lab: Routine Histopathology (formalin-fixed tissue)    2. Keloid (2)  Right Breast; Left Abdomen (side) - Upper    Discussed  benign nature of keloid with patient.  If area bothersome intralesional Kenalog to be performed to flatten the keloid however it may not take away some of the pigment patient opts for observation at this time.    3. Post-inflammatory hyperpigmentation  Left Abdomen (side) - Upper    Discussed with patient that this was postinflammatory hyperpigmentation from his recent bout of shingles.  This area may take weeks to months to blind.  Bleaching the area will not help as it is not melanin deposition.         FOLLOW UP: As needed-I will call patient with biopsy results    The patient was encouraged to contact me with any further questions or concerns.  Sangita Ulloa PA-C  2/12/2024

## 2024-02-14 LAB
LABORATORY COMMENT REPORT: NORMAL
PATH REPORT.FINAL DX SPEC: NORMAL
PATH REPORT.GROSS SPEC: NORMAL
PATH REPORT.MICROSCOPIC SPEC OTHER STN: NORMAL
PATH REPORT.RELEVANT HX SPEC: NORMAL
PATH REPORT.TOTAL CANCER: NORMAL

## 2024-02-27 RX ORDER — SODIUM CHLORIDE, SODIUM LACTATE, POTASSIUM CHLORIDE, CALCIUM CHLORIDE 600; 310; 30; 20 MG/100ML; MG/100ML; MG/100ML; MG/100ML
20 INJECTION, SOLUTION INTRAVENOUS CONTINUOUS
Status: CANCELLED | OUTPATIENT
Start: 2024-02-27

## 2024-02-28 ENCOUNTER — HOSPITAL ENCOUNTER (OUTPATIENT)
Dept: GASTROENTEROLOGY | Facility: HOSPITAL | Age: 69
Setting detail: OUTPATIENT SURGERY
Discharge: HOME | End: 2024-02-28
Payer: MEDICARE

## 2024-02-28 VITALS
OXYGEN SATURATION: 96 % | WEIGHT: 214.6 LBS | BODY MASS INDEX: 29.07 KG/M2 | RESPIRATION RATE: 20 BRPM | SYSTOLIC BLOOD PRESSURE: 110 MMHG | TEMPERATURE: 97.3 F | HEART RATE: 97 BPM | HEIGHT: 72 IN | DIASTOLIC BLOOD PRESSURE: 69 MMHG

## 2024-02-28 DIAGNOSIS — Z12.11 ENCOUNTER FOR SCREENING COLONOSCOPY: ICD-10-CM

## 2024-02-28 PROCEDURE — 99152 MOD SED SAME PHYS/QHP 5/>YRS: CPT | Performed by: INTERNAL MEDICINE

## 2024-02-28 PROCEDURE — 45378 DIAGNOSTIC COLONOSCOPY: CPT | Performed by: INTERNAL MEDICINE

## 2024-02-28 PROCEDURE — G0500 MOD SEDAT ENDO SERVICE >5YRS: HCPCS

## 2024-02-28 PROCEDURE — G0121 COLON CA SCRN NOT HI RSK IND: HCPCS | Performed by: INTERNAL MEDICINE

## 2024-02-28 PROCEDURE — 2500000004 HC RX 250 GENERAL PHARMACY W/ HCPCS (ALT 636 FOR OP/ED): Performed by: INTERNAL MEDICINE

## 2024-02-28 PROCEDURE — 7100000009 HC PHASE TWO TIME - INITIAL BASE CHARGE

## 2024-02-28 PROCEDURE — 3700000012 HC SEDATION LEVEL 5+ TIME - INITIAL 15 MINUTES 5/> YEARS

## 2024-02-28 PROCEDURE — 7100000010 HC PHASE TWO TIME - EACH INCREMENTAL 1 MINUTE

## 2024-02-28 RX ORDER — FENTANYL CITRATE 50 UG/ML
INJECTION, SOLUTION INTRAMUSCULAR; INTRAVENOUS AS NEEDED
Status: COMPLETED | OUTPATIENT
Start: 2024-02-28 | End: 2024-02-28

## 2024-02-28 RX ORDER — MIDAZOLAM HYDROCHLORIDE 1 MG/ML
INJECTION INTRAMUSCULAR; INTRAVENOUS AS NEEDED
Status: COMPLETED | OUTPATIENT
Start: 2024-02-28 | End: 2024-02-28

## 2024-02-28 RX ADMIN — FENTANYL CITRATE 25 MCG: 50 INJECTION, SOLUTION INTRAMUSCULAR; INTRAVENOUS at 15:06

## 2024-02-28 RX ADMIN — MIDAZOLAM HYDROCHLORIDE 2 MG: 1 INJECTION INTRAMUSCULAR; INTRAVENOUS at 15:06

## 2024-02-28 RX ADMIN — MIDAZOLAM HYDROCHLORIDE 1 MG: 1 INJECTION INTRAMUSCULAR; INTRAVENOUS at 15:09

## 2024-02-28 RX ADMIN — FENTANYL CITRATE 75 MCG: 50 INJECTION, SOLUTION INTRAMUSCULAR; INTRAVENOUS at 15:05

## 2024-02-28 RX ADMIN — MIDAZOLAM HYDROCHLORIDE 2 MG: 1 INJECTION INTRAMUSCULAR; INTRAVENOUS at 15:05

## 2024-02-28 ASSESSMENT — PAIN SCALES - GENERAL
PAINLEVEL_OUTOF10: 0 - NO PAIN

## 2024-02-28 ASSESSMENT — COLUMBIA-SUICIDE SEVERITY RATING SCALE - C-SSRS
2. HAVE YOU ACTUALLY HAD ANY THOUGHTS OF KILLING YOURSELF?: NO
1. IN THE PAST MONTH, HAVE YOU WISHED YOU WERE DEAD OR WISHED YOU COULD GO TO SLEEP AND NOT WAKE UP?: NO
6. HAVE YOU EVER DONE ANYTHING, STARTED TO DO ANYTHING, OR PREPARED TO DO ANYTHING TO END YOUR LIFE?: NO

## 2024-02-28 ASSESSMENT — PAIN - FUNCTIONAL ASSESSMENT
PAIN_FUNCTIONAL_ASSESSMENT: 0-10

## 2024-02-28 NOTE — DISCHARGE INSTRUCTIONS

## 2024-02-28 NOTE — H&P
History Of Present Illness  Ariel Howard Jr. is a 68 y.o. male presenting with colon cancer screening.     Past Medical History  Past Medical History:   Diagnosis Date    Abnormal weight gain 02/21/2014    Weight gain    Acute upper respiratory infection, unspecified 12/07/2013    Acute upper respiratory infection    Body mass index (BMI) 29.0-29.9, adult 05/03/2021    BMI 29.0-29.9,adult    Encounter for general adult medical examination without abnormal findings 01/04/2014    Encounter for general health examination    Encounter for immunization 09/28/2020    Need for prophylactic vaccination and inoculation against influenza    Encounter for immunization 10/25/2013    Need for prophylactic vaccination with measles-mumps-rubella (MMR) vaccine    Encounter for screening for malignant neoplasm of prostate 01/04/2014    Encounter for screening for malignant neoplasm of prostate    Encounter for screening for other viral diseases 10/07/2013    Screening for viral disease    Hyperlipidemia     Hypertension     Impacted cerumen, bilateral 12/03/2016    Excessive cerumen in both ear canals    Impacted cerumen, left ear 07/03/2014    Impacted cerumen of left ear    Other conditions influencing health status 06/21/2013    Screening for malignant neoplasms, colon     Surgical History  Past Surgical History:   Procedure Laterality Date    COLONOSCOPY      WISDOM TOOTH EXTRACTION       Social History  He reports that he has never smoked. He has never used smokeless tobacco. He reports that he does not drink alcohol and does not use drugs.    Family History  Family History   Problem Relation Name Age of Onset    Heart disease Paternal Grandmother      Heart disease Paternal Grandfather      Cancer Other      Hypertension Other      Other (Stroke syndrome) Other          Allergies  No Known Allergies  Review of Systems  Pre-sedation Evaluation:  ASA Classification - ASA 2 - Patient with mild systemic disease with no  functional limitations  Mallampati Score - II (hard and soft palate, upper portion of tonsils anduvula visible)    Physical Exam  Vitals and nursing note reviewed.   Constitutional:       Appearance: Normal appearance.   Cardiovascular:      Rate and Rhythm: Normal rate and regular rhythm.      Pulses: Normal pulses.      Heart sounds: Normal heart sounds.   Pulmonary:      Effort: Pulmonary effort is normal.      Breath sounds: Normal breath sounds.   Abdominal:      General: Abdomen is flat.      Palpations: Abdomen is soft.   Neurological:      Mental Status: He is alert.          Last Recorded Vitals  Blood pressure 143/81, pulse 105, temperature 36 °C (96.8 °F), temperature source Temporal, resp. rate 16, height 1.829 m (6'), weight 97.3 kg (214 lb 9.5 oz), SpO2 98 %.     Assessment/Plan   Colon cancer screening    Proceed with colonoscopy     PTA/Current Medications:  (Not in a hospital admission)    Current Outpatient Medications   Medication Sig Dispense Refill    aspirin 81 mg EC tablet TAKE 1 TABLET BY MOUTH EVERY DAY 90 tablet 3    atorvastatin (Lipitor) 20 mg tablet TAKE 1 TABLET BY MOUTH EVERY DAY 90 tablet 3    lisinopriL-hydrochlorothiazide 20-25 mg tablet TAKE 1 TABLET BY MOUTH EVERY DAY 90 tablet 3    polyethylene glycol (Glycolax, Miralax) 17 gram/dose powder Take 17 g by mouth 1 time.      travoprost (Travatan Z) 0.004 % drops ophthalmic solution Administer 1 drop into both eyes once daily at bedtime.       No current facility-administered medications for this encounter.     Sorin Downey MD

## 2024-02-29 ASSESSMENT — PAIN SCALES - GENERAL: PAINLEVEL_OUTOF10: 0 - NO PAIN

## 2024-04-12 ENCOUNTER — LAB (OUTPATIENT)
Dept: LAB | Facility: LAB | Age: 69
End: 2024-04-12
Payer: MEDICARE

## 2024-04-12 DIAGNOSIS — R97.20 ELEVATED PSA: ICD-10-CM

## 2024-04-12 PROCEDURE — 36415 COLL VENOUS BLD VENIPUNCTURE: CPT

## 2024-04-12 PROCEDURE — 84154 ASSAY OF PSA FREE: CPT

## 2024-04-12 PROCEDURE — 84153 ASSAY OF PSA TOTAL: CPT

## 2024-04-14 LAB
PSA FREE MFR SERPL: 52 %
PSA FREE SERPL-MCNC: 1.4 NG/ML
PSA SERPL IA-MCNC: 2.7 NG/ML (ref 0–4)

## 2024-04-15 NOTE — PROGRESS NOTES
FUV    Last visit - 10/26/23     HISTORY OF PRESENT ILLNESS:   Ariel Howard Jr. is a 68 y.o. male who is being seen today for 6 month FUV  - Elevated PSA  - MRI on 8/5/2022 demonstrated an intermediate PI-RADS 3 lesion    PSA trend:  -2.7 with 52% on 4/12/24  -1.81 on 10/12/23  - 2.2 total with 36% free on 4/5/23  - 2.0 total with 40% free on 9/6/22    PAST MEDICAL HISTORY:  Past Medical History:   Diagnosis Date    Abnormal weight gain 02/21/2014    Weight gain    Acute upper respiratory infection, unspecified 12/07/2013    Acute upper respiratory infection    Body mass index (BMI) 29.0-29.9, adult 05/03/2021    BMI 29.0-29.9,adult    Encounter for general adult medical examination without abnormal findings 01/04/2014    Encounter for general health examination    Encounter for immunization 09/28/2020    Need for prophylactic vaccination and inoculation against influenza    Encounter for immunization 10/25/2013    Need for prophylactic vaccination with measles-mumps-rubella (MMR) vaccine    Encounter for screening for malignant neoplasm of prostate 01/04/2014    Encounter for screening for malignant neoplasm of prostate    Encounter for screening for other viral diseases 10/07/2013    Screening for viral disease    Hyperlipidemia     Hypertension     Impacted cerumen, bilateral 12/03/2016    Excessive cerumen in both ear canals    Impacted cerumen, left ear 07/03/2014    Impacted cerumen of left ear    Other conditions influencing health status 06/21/2013    Screening for malignant neoplasms, colon       PAST SURGICAL HISTORY:  Past Surgical History:   Procedure Laterality Date    COLONOSCOPY      WISDOM TOOTH EXTRACTION          ALLERGIES:   No Known Allergies     MEDICATIONS:   Current Outpatient Medications   Medication Instructions    aspirin 81 mg, oral, Daily    atorvastatin (LIPITOR) 20 mg, oral, Daily    lisinopriL-hydrochlorothiazide 20-25 mg tablet 1 tablet, oral, Daily    polyethylene glycol  "(GLYCOLAX, MIRALAX) 17 g, oral, Once    travoprost (Travatan Z) 0.004 % drops ophthalmic solution 1 drop, Both Eyes, Nightly        PHYSICAL EXAM:  There were no vitals taken for this visit.  Constitutional: Patient appears well-developed and well-nourished. No distress.    Pulmonary/Chest: Effort normal. No respiratory distress.   Abdominal: Soft, ND NT  : WNL  Musculoskeletal: Normal range of motion.    Neurological: Alert and oriented to person, place, and time.  Psychiatric: Normal mood and affect. Behavior is normal. Thought content normal.      Labs:  No results found for: \"TESTOSTERONE\"  Lab Results   Component Value Date    PSA 2.7 04/12/2024     No components found for: \"CBC\"  Lab Results   Component Value Date    CREATININE 1.27 10/31/2023     No components found for: \"TESTOTMS\"  No results found for: \"TESTF\"    Imaging:    Discussion: Results reviewed with patient. Patient reassured. Patient doing well. No new urinary complaints. Follow up in 6 months with PSA panel prior.         Assessment:    No diagnosis found.    Ariel Howard Jr. is a 68 y.o. male here for FUV     Plan:   1) Follow up in 6 months with PSA panel prior.   All questions and concerns were addressed. Patient verbalizes understanding and has no other questions at this time.     Scribe Attestation  By signing my name below, IAura , Scribleo   attest that this documentation has been prepared under the direction and in the presence of Oumar Martinez MD.    "

## 2024-04-19 ENCOUNTER — TELEMEDICINE (OUTPATIENT)
Dept: UROLOGY | Facility: HOSPITAL | Age: 69
End: 2024-04-19
Payer: MEDICARE

## 2024-04-19 DIAGNOSIS — R97.20 ELEVATED PSA: ICD-10-CM

## 2024-04-19 PROCEDURE — 1159F MED LIST DOCD IN RCRD: CPT | Performed by: UROLOGY

## 2024-04-19 PROCEDURE — G2211 COMPLEX E/M VISIT ADD ON: HCPCS | Performed by: UROLOGY

## 2024-04-19 PROCEDURE — 1160F RVW MEDS BY RX/DR IN RCRD: CPT | Performed by: UROLOGY

## 2024-04-19 PROCEDURE — 99213 OFFICE O/P EST LOW 20 MIN: CPT | Performed by: UROLOGY

## 2024-04-26 ENCOUNTER — TELEPHONE (OUTPATIENT)
Dept: UROLOGY | Facility: HOSPITAL | Age: 69
End: 2024-04-26

## 2024-04-26 ENCOUNTER — APPOINTMENT (OUTPATIENT)
Dept: UROLOGY | Facility: HOSPITAL | Age: 69
End: 2024-04-26
Payer: MEDICARE

## 2024-04-26 NOTE — TELEPHONE ENCOUNTER
Attempted to call patient to schedule 6 month follow up with Dr. Martinez. LVM to call to schedule appointment.

## 2024-05-27 ENCOUNTER — APPOINTMENT (OUTPATIENT)
Dept: RADIOLOGY | Facility: HOSPITAL | Age: 69
End: 2024-05-27
Payer: MEDICARE

## 2024-05-27 ENCOUNTER — HOSPITAL ENCOUNTER (OUTPATIENT)
Facility: HOSPITAL | Age: 69
Setting detail: OBSERVATION
Discharge: HOME | End: 2024-05-28
Attending: STUDENT IN AN ORGANIZED HEALTH CARE EDUCATION/TRAINING PROGRAM | Admitting: INTERNAL MEDICINE
Payer: MEDICARE

## 2024-05-27 DIAGNOSIS — I65.23 OCCLUSION AND STENOSIS OF BILATERAL CAROTID ARTERIES: ICD-10-CM

## 2024-05-27 DIAGNOSIS — R55 SYNCOPE, UNSPECIFIED SYNCOPE TYPE: Primary | ICD-10-CM

## 2024-05-27 LAB
ALBUMIN SERPL BCP-MCNC: 3.7 G/DL (ref 3.4–5)
ALP SERPL-CCNC: 53 U/L (ref 33–136)
ALT SERPL W P-5'-P-CCNC: 10 U/L (ref 10–52)
ANION GAP SERPL CALC-SCNC: 12 MMOL/L (ref 10–20)
APPEARANCE UR: CLEAR
AST SERPL W P-5'-P-CCNC: 20 U/L (ref 9–39)
BASOPHILS # BLD AUTO: 0.04 X10*3/UL (ref 0–0.1)
BASOPHILS NFR BLD AUTO: 0.5 %
BILIRUB SERPL-MCNC: 0.5 MG/DL (ref 0–1.2)
BILIRUB UR STRIP.AUTO-MCNC: NEGATIVE MG/DL
BUN SERPL-MCNC: 22 MG/DL (ref 6–23)
CALCIUM SERPL-MCNC: 8.8 MG/DL (ref 8.6–10.3)
CARDIAC TROPONIN I PNL SERPL HS: 4 NG/L (ref 0–20)
CARDIAC TROPONIN I PNL SERPL HS: 5 NG/L (ref 0–20)
CHLORIDE SERPL-SCNC: 102 MMOL/L (ref 98–107)
CO2 SERPL-SCNC: 29 MMOL/L (ref 21–32)
COLOR UR: NORMAL
CREAT SERPL-MCNC: 1.34 MG/DL (ref 0.5–1.3)
D DIMER PPP FEU-MCNC: 2082 NG/ML FEU
EGFRCR SERPLBLD CKD-EPI 2021: 58 ML/MIN/1.73M*2
EOSINOPHIL # BLD AUTO: 0.35 X10*3/UL (ref 0–0.7)
EOSINOPHIL NFR BLD AUTO: 4.1 %
ERYTHROCYTE [DISTWIDTH] IN BLOOD BY AUTOMATED COUNT: 14.7 % (ref 11.5–14.5)
GLUCOSE SERPL-MCNC: 95 MG/DL (ref 74–99)
GLUCOSE UR STRIP.AUTO-MCNC: NORMAL MG/DL
HCT VFR BLD AUTO: 39.5 % (ref 41–52)
HGB BLD-MCNC: 13.1 G/DL (ref 13.5–17.5)
IMM GRANULOCYTES # BLD AUTO: 0.02 X10*3/UL (ref 0–0.7)
IMM GRANULOCYTES NFR BLD AUTO: 0.2 % (ref 0–0.9)
KETONES UR STRIP.AUTO-MCNC: NEGATIVE MG/DL
LEUKOCYTE ESTERASE UR QL STRIP.AUTO: NEGATIVE
LYMPHOCYTES # BLD AUTO: 3.21 X10*3/UL (ref 1.2–4.8)
LYMPHOCYTES NFR BLD AUTO: 37.8 %
MAGNESIUM SERPL-MCNC: 2.2 MG/DL (ref 1.6–2.4)
MCH RBC QN AUTO: 25.9 PG (ref 26–34)
MCHC RBC AUTO-ENTMCNC: 33.2 G/DL (ref 32–36)
MCV RBC AUTO: 78 FL (ref 80–100)
MONOCYTES # BLD AUTO: 0.76 X10*3/UL (ref 0.1–1)
MONOCYTES NFR BLD AUTO: 8.9 %
NEUTROPHILS # BLD AUTO: 4.12 X10*3/UL (ref 1.2–7.7)
NEUTROPHILS NFR BLD AUTO: 48.5 %
NITRITE UR QL STRIP.AUTO: NEGATIVE
NRBC BLD-RTO: 0 /100 WBCS (ref 0–0)
PH UR STRIP.AUTO: 6 [PH]
PHOSPHATE SERPL-MCNC: 2.7 MG/DL (ref 2.5–4.9)
PLATELET # BLD AUTO: 179 X10*3/UL (ref 150–450)
POTASSIUM SERPL-SCNC: 3.1 MMOL/L (ref 3.5–5.3)
PROT SERPL-MCNC: 6.9 G/DL (ref 6.4–8.2)
PROT UR STRIP.AUTO-MCNC: NEGATIVE MG/DL
RBC # BLD AUTO: 5.05 X10*6/UL (ref 4.5–5.9)
RBC # UR STRIP.AUTO: NEGATIVE /UL
SODIUM SERPL-SCNC: 140 MMOL/L (ref 136–145)
SP GR UR STRIP.AUTO: 1.01
UROBILINOGEN UR STRIP.AUTO-MCNC: NORMAL MG/DL
WBC # BLD AUTO: 8.5 X10*3/UL (ref 4.4–11.3)

## 2024-05-27 PROCEDURE — 93005 ELECTROCARDIOGRAM TRACING: CPT

## 2024-05-27 PROCEDURE — 70450 CT HEAD/BRAIN W/O DYE: CPT | Performed by: RADIOLOGY

## 2024-05-27 PROCEDURE — 84484 ASSAY OF TROPONIN QUANT: CPT

## 2024-05-27 PROCEDURE — 36415 COLL VENOUS BLD VENIPUNCTURE: CPT

## 2024-05-27 PROCEDURE — 84075 ASSAY ALKALINE PHOSPHATASE: CPT

## 2024-05-27 PROCEDURE — 84100 ASSAY OF PHOSPHORUS: CPT

## 2024-05-27 PROCEDURE — 83880 ASSAY OF NATRIURETIC PEPTIDE: CPT

## 2024-05-27 PROCEDURE — 83735 ASSAY OF MAGNESIUM: CPT

## 2024-05-27 PROCEDURE — 81003 URINALYSIS AUTO W/O SCOPE: CPT

## 2024-05-27 PROCEDURE — 72125 CT NECK SPINE W/O DYE: CPT | Performed by: RADIOLOGY

## 2024-05-27 PROCEDURE — 99285 EMERGENCY DEPT VISIT HI MDM: CPT

## 2024-05-27 PROCEDURE — 2500000001 HC RX 250 WO HCPCS SELF ADMINISTERED DRUGS (ALT 637 FOR MEDICARE OP)

## 2024-05-27 PROCEDURE — 72125 CT NECK SPINE W/O DYE: CPT

## 2024-05-27 PROCEDURE — 70450 CT HEAD/BRAIN W/O DYE: CPT

## 2024-05-27 PROCEDURE — 85379 FIBRIN DEGRADATION QUANT: CPT

## 2024-05-27 PROCEDURE — 85025 COMPLETE CBC W/AUTO DIFF WBC: CPT

## 2024-05-27 PROCEDURE — 2500000004 HC RX 250 GENERAL PHARMACY W/ HCPCS (ALT 636 FOR OP/ED)

## 2024-05-27 PROCEDURE — 96360 HYDRATION IV INFUSION INIT: CPT

## 2024-05-27 RX ORDER — ACETAMINOPHEN 325 MG/1
975 TABLET ORAL ONCE
Status: COMPLETED | OUTPATIENT
Start: 2024-05-27 | End: 2024-05-27

## 2024-05-27 RX ORDER — POTASSIUM CHLORIDE 1.5 G/1.58G
40 POWDER, FOR SOLUTION ORAL ONCE
Status: COMPLETED | OUTPATIENT
Start: 2024-05-27 | End: 2024-05-27

## 2024-05-27 RX ADMIN — SODIUM CHLORIDE, POTASSIUM CHLORIDE, SODIUM LACTATE AND CALCIUM CHLORIDE 1000 ML: 600; 310; 30; 20 INJECTION, SOLUTION INTRAVENOUS at 21:53

## 2024-05-27 RX ADMIN — POTASSIUM CHLORIDE 40 MEQ: 1.5 POWDER, FOR SOLUTION ORAL at 23:11

## 2024-05-27 RX ADMIN — ACETAMINOPHEN 975 MG: 325 TABLET ORAL at 21:52

## 2024-05-27 ASSESSMENT — COLUMBIA-SUICIDE SEVERITY RATING SCALE - C-SSRS
6. HAVE YOU EVER DONE ANYTHING, STARTED TO DO ANYTHING, OR PREPARED TO DO ANYTHING TO END YOUR LIFE?: NO
1. IN THE PAST MONTH, HAVE YOU WISHED YOU WERE DEAD OR WISHED YOU COULD GO TO SLEEP AND NOT WAKE UP?: NO
2. HAVE YOU ACTUALLY HAD ANY THOUGHTS OF KILLING YOURSELF?: NO

## 2024-05-27 ASSESSMENT — PAIN DESCRIPTION - DESCRIPTORS: DESCRIPTORS: TENDER;SORE

## 2024-05-27 ASSESSMENT — PAIN - FUNCTIONAL ASSESSMENT
PAIN_FUNCTIONAL_ASSESSMENT: 0-10

## 2024-05-27 ASSESSMENT — PAIN SCALES - GENERAL
PAINLEVEL_OUTOF10: 1
PAINLEVEL_OUTOF10: 1
PAINLEVEL_OUTOF10: 0 - NO PAIN

## 2024-05-27 ASSESSMENT — PAIN DESCRIPTION - ORIENTATION: ORIENTATION: LEFT;POSTERIOR

## 2024-05-27 ASSESSMENT — PAIN DESCRIPTION - PROGRESSION: CLINICAL_PROGRESSION: NOT CHANGED

## 2024-05-27 ASSESSMENT — PAIN DESCRIPTION - PAIN TYPE: TYPE: ACUTE PAIN

## 2024-05-28 ENCOUNTER — APPOINTMENT (OUTPATIENT)
Dept: RADIOLOGY | Facility: HOSPITAL | Age: 69
End: 2024-05-28
Payer: MEDICARE

## 2024-05-28 ENCOUNTER — APPOINTMENT (OUTPATIENT)
Dept: VASCULAR MEDICINE | Facility: HOSPITAL | Age: 69
End: 2024-05-28
Payer: MEDICARE

## 2024-05-28 ENCOUNTER — APPOINTMENT (OUTPATIENT)
Dept: CARDIOLOGY | Facility: HOSPITAL | Age: 69
End: 2024-05-28
Payer: MEDICARE

## 2024-05-28 VITALS
SYSTOLIC BLOOD PRESSURE: 131 MMHG | HEIGHT: 72 IN | DIASTOLIC BLOOD PRESSURE: 85 MMHG | TEMPERATURE: 97 F | HEART RATE: 68 BPM | BODY MASS INDEX: 29.11 KG/M2 | OXYGEN SATURATION: 99 % | WEIGHT: 214.95 LBS | RESPIRATION RATE: 17 BRPM

## 2024-05-28 PROBLEM — Z12.83 SKIN CANCER SCREENING: Status: RESOLVED | Noted: 2023-10-31 | Resolved: 2024-05-28

## 2024-05-28 PROBLEM — R55 SYNCOPE, UNSPECIFIED SYNCOPE TYPE: Status: ACTIVE | Noted: 2024-05-28

## 2024-05-28 PROBLEM — D17.30 LIPOMA OF SKIN: Status: RESOLVED | Noted: 2023-10-25 | Resolved: 2024-05-28

## 2024-05-28 PROBLEM — H61.23 EXCESSIVE CERUMEN IN BOTH EAR CANALS: Status: RESOLVED | Noted: 2023-10-25 | Resolved: 2024-05-28

## 2024-05-28 PROBLEM — E87.6 HYPOKALEMIA: Status: RESOLVED | Noted: 2023-10-25 | Resolved: 2024-05-28

## 2024-05-28 PROBLEM — R94.31 ABNORMAL EKG: Status: RESOLVED | Noted: 2023-10-25 | Resolved: 2024-05-28

## 2024-05-28 PROBLEM — U07.1 DISEASE DUE TO SEVERE ACUTE RESPIRATORY SYNDROME CORONAVIRUS 2 (SARS-COV-2): Status: RESOLVED | Noted: 2023-10-25 | Resolved: 2024-05-28

## 2024-05-28 PROBLEM — R97.20 ELEVATED PSA: Status: RESOLVED | Noted: 2023-10-25 | Resolved: 2024-05-28

## 2024-05-28 PROBLEM — L98.9 SKIN LESION: Status: RESOLVED | Noted: 2023-10-25 | Resolved: 2024-05-28

## 2024-05-28 PROBLEM — R39.89 ABNORMAL COMPLIANCE OF BLADDER: Status: RESOLVED | Noted: 2023-10-25 | Resolved: 2024-05-28

## 2024-05-28 PROBLEM — R73.9 HYPERGLYCEMIA: Status: RESOLVED | Noted: 2023-10-25 | Resolved: 2024-05-28

## 2024-05-28 PROBLEM — R06.09 EXERTIONAL DYSPNEA: Status: RESOLVED | Noted: 2023-10-25 | Resolved: 2024-05-28

## 2024-05-28 PROBLEM — L85.3 DRY SKIN: Status: RESOLVED | Noted: 2023-10-25 | Resolved: 2024-05-28

## 2024-05-28 PROBLEM — B02.9 SHINGLES: Status: RESOLVED | Noted: 2023-10-25 | Resolved: 2024-05-28

## 2024-05-28 LAB
ANION GAP SERPL CALC-SCNC: 9 MMOL/L (ref 10–20)
AORTIC VALVE MEAN GRADIENT: 3.7 MMHG
AORTIC VALVE PEAK VELOCITY: 1.34 M/S
ATRIAL RATE: 67 BPM
AV PEAK GRADIENT: 7.2 MMHG
AVA (PEAK VEL): 3.31 CM2
AVA (VTI): 3.45 CM2
BNP SERPL-MCNC: 14 PG/ML (ref 0–99)
BODY SURFACE AREA: 2.23 M2
BUN SERPL-MCNC: 18 MG/DL (ref 6–23)
CALCIUM SERPL-MCNC: 8.8 MG/DL (ref 8.6–10.3)
CHLORIDE SERPL-SCNC: 103 MMOL/L (ref 98–107)
CO2 SERPL-SCNC: 28 MMOL/L (ref 21–32)
CREAT SERPL-MCNC: 1.26 MG/DL (ref 0.5–1.3)
EGFRCR SERPLBLD CKD-EPI 2021: 62 ML/MIN/1.73M*2
EJECTION FRACTION APICAL 4 CHAMBER: 68.4
ERYTHROCYTE [DISTWIDTH] IN BLOOD BY AUTOMATED COUNT: 14.6 % (ref 11.5–14.5)
GLUCOSE SERPL-MCNC: 143 MG/DL (ref 74–99)
HCT VFR BLD AUTO: 37.7 % (ref 41–52)
HGB BLD-MCNC: 12.3 G/DL (ref 13.5–17.5)
LEFT ATRIUM VOLUME AREA LENGTH INDEX BSA: 20.1 ML/M2
LEFT VENTRICLE INTERNAL DIMENSION DIASTOLE: 4.93 CM (ref 3.5–6)
LEFT VENTRICULAR OUTFLOW TRACT DIAMETER: 2.3 CM
LV EJECTION FRACTION BIPLANE: 66 %
MCH RBC QN AUTO: 25.7 PG (ref 26–34)
MCHC RBC AUTO-ENTMCNC: 32.6 G/DL (ref 32–36)
MCV RBC AUTO: 79 FL (ref 80–100)
MITRAL VALVE E/A RATIO: 0.92
NRBC BLD-RTO: 0 /100 WBCS (ref 0–0)
P AXIS: 28 DEGREES
P OFFSET: 197 MS
P ONSET: 142 MS
PLATELET # BLD AUTO: 161 X10*3/UL (ref 150–450)
POTASSIUM SERPL-SCNC: 3.3 MMOL/L (ref 3.5–5.3)
PR INTERVAL: 174 MS
Q ONSET: 229 MS
QRS COUNT: 11 BEATS
QRS DURATION: 78 MS
QT INTERVAL: 402 MS
QTC CALCULATION(BAZETT): 424 MS
QTC FREDERICIA: 417 MS
R AXIS: -15 DEGREES
RBC # BLD AUTO: 4.78 X10*6/UL (ref 4.5–5.9)
RIGHT VENTRICLE FREE WALL PEAK S': 20 CM/S
RIGHT VENTRICLE PEAK SYSTOLIC PRESSURE: 31.2 MMHG
SODIUM SERPL-SCNC: 137 MMOL/L (ref 136–145)
T AXIS: -12 DEGREES
T OFFSET: 430 MS
TRICUSPID ANNULAR PLANE SYSTOLIC EXCURSION: 3.2 CM
TSH SERPL-ACNC: 1.52 MIU/L (ref 0.44–3.98)
VENTRICULAR RATE: 67 BPM
WBC # BLD AUTO: 8.3 X10*3/UL (ref 4.4–11.3)

## 2024-05-28 PROCEDURE — 2500000004 HC RX 250 GENERAL PHARMACY W/ HCPCS (ALT 636 FOR OP/ED): Performed by: INTERNAL MEDICINE

## 2024-05-28 PROCEDURE — 36415 COLL VENOUS BLD VENIPUNCTURE: CPT | Performed by: INTERNAL MEDICINE

## 2024-05-28 PROCEDURE — 2500000001 HC RX 250 WO HCPCS SELF ADMINISTERED DRUGS (ALT 637 FOR MEDICARE OP): Performed by: NURSE PRACTITIONER

## 2024-05-28 PROCEDURE — 93880 EXTRACRANIAL BILAT STUDY: CPT | Performed by: SURGERY

## 2024-05-28 PROCEDURE — 93306 TTE W/DOPPLER COMPLETE: CPT | Performed by: STUDENT IN AN ORGANIZED HEALTH CARE EDUCATION/TRAINING PROGRAM

## 2024-05-28 PROCEDURE — 2550000001 HC RX 255 CONTRASTS

## 2024-05-28 PROCEDURE — G0378 HOSPITAL OBSERVATION PER HR: HCPCS

## 2024-05-28 PROCEDURE — 2500000001 HC RX 250 WO HCPCS SELF ADMINISTERED DRUGS (ALT 637 FOR MEDICARE OP): Performed by: INTERNAL MEDICINE

## 2024-05-28 PROCEDURE — 93306 TTE W/DOPPLER COMPLETE: CPT

## 2024-05-28 PROCEDURE — 71275 CT ANGIOGRAPHY CHEST: CPT

## 2024-05-28 PROCEDURE — 82374 ASSAY BLOOD CARBON DIOXIDE: CPT | Performed by: INTERNAL MEDICINE

## 2024-05-28 PROCEDURE — 85027 COMPLETE CBC AUTOMATED: CPT | Performed by: INTERNAL MEDICINE

## 2024-05-28 PROCEDURE — 93246 EXT ECG>7D<15D RECORDING: CPT

## 2024-05-28 PROCEDURE — 84443 ASSAY THYROID STIM HORMONE: CPT | Performed by: NURSE PRACTITIONER

## 2024-05-28 PROCEDURE — 71275 CT ANGIOGRAPHY CHEST: CPT | Performed by: RADIOLOGY

## 2024-05-28 PROCEDURE — 93880 EXTRACRANIAL BILAT STUDY: CPT

## 2024-05-28 RX ORDER — LISINOPRIL 20 MG/1
20 TABLET ORAL DAILY
Status: DISCONTINUED | OUTPATIENT
Start: 2024-05-28 | End: 2024-05-28 | Stop reason: HOSPADM

## 2024-05-28 RX ORDER — ATORVASTATIN CALCIUM 20 MG/1
20 TABLET, FILM COATED ORAL DAILY
Status: DISCONTINUED | OUTPATIENT
Start: 2024-05-28 | End: 2024-05-28 | Stop reason: HOSPADM

## 2024-05-28 RX ORDER — ASPIRIN 81 MG/1
81 TABLET ORAL DAILY
Status: DISCONTINUED | OUTPATIENT
Start: 2024-05-28 | End: 2024-05-28 | Stop reason: HOSPADM

## 2024-05-28 RX ORDER — ACETAMINOPHEN 325 MG/1
650 TABLET ORAL EVERY 4 HOURS PRN
Status: DISCONTINUED | OUTPATIENT
Start: 2024-05-28 | End: 2024-05-28 | Stop reason: HOSPADM

## 2024-05-28 RX ORDER — LATANOPROST 50 UG/ML
1 SOLUTION/ DROPS OPHTHALMIC NIGHTLY
Status: DISCONTINUED | OUTPATIENT
Start: 2024-05-28 | End: 2024-05-28 | Stop reason: HOSPADM

## 2024-05-28 RX ORDER — ACETAMINOPHEN 650 MG/1
650 SUPPOSITORY RECTAL EVERY 4 HOURS PRN
Status: DISCONTINUED | OUTPATIENT
Start: 2024-05-28 | End: 2024-05-28

## 2024-05-28 RX ORDER — PANTOPRAZOLE SODIUM 40 MG/1
40 TABLET, DELAYED RELEASE ORAL
Status: DISCONTINUED | OUTPATIENT
Start: 2024-05-28 | End: 2024-05-28 | Stop reason: HOSPADM

## 2024-05-28 RX ORDER — SODIUM CHLORIDE AND POTASSIUM CHLORIDE 150; 900 MG/100ML; MG/100ML
100 INJECTION, SOLUTION INTRAVENOUS CONTINUOUS
Status: DISCONTINUED | OUTPATIENT
Start: 2024-05-28 | End: 2024-05-28 | Stop reason: HOSPADM

## 2024-05-28 RX ORDER — ONDANSETRON 4 MG/1
4 TABLET, FILM COATED ORAL EVERY 8 HOURS PRN
Status: DISCONTINUED | OUTPATIENT
Start: 2024-05-28 | End: 2024-05-28 | Stop reason: HOSPADM

## 2024-05-28 RX ORDER — ONDANSETRON HYDROCHLORIDE 2 MG/ML
4 INJECTION, SOLUTION INTRAVENOUS EVERY 8 HOURS PRN
Status: DISCONTINUED | OUTPATIENT
Start: 2024-05-28 | End: 2024-05-28 | Stop reason: HOSPADM

## 2024-05-28 RX ORDER — PANTOPRAZOLE SODIUM 40 MG/10ML
40 INJECTION, POWDER, LYOPHILIZED, FOR SOLUTION INTRAVENOUS
Status: DISCONTINUED | OUTPATIENT
Start: 2024-05-28 | End: 2024-05-28 | Stop reason: HOSPADM

## 2024-05-28 RX ORDER — ACETAMINOPHEN 160 MG/5ML
650 SUSPENSION ORAL EVERY 4 HOURS PRN
Status: DISCONTINUED | OUTPATIENT
Start: 2024-05-28 | End: 2024-05-28

## 2024-05-28 RX ADMIN — LISINOPRIL 20 MG: 20 TABLET ORAL at 09:28

## 2024-05-28 RX ADMIN — ATORVASTATIN CALCIUM 20 MG: 20 TABLET, FILM COATED ORAL at 09:28

## 2024-05-28 RX ADMIN — POTASSIUM CHLORIDE AND SODIUM CHLORIDE 100 ML/HR: 900; 150 INJECTION, SOLUTION INTRAVENOUS at 05:07

## 2024-05-28 RX ADMIN — IOHEXOL 75 ML: 350 INJECTION, SOLUTION INTRAVENOUS at 00:56

## 2024-05-28 RX ADMIN — PANTOPRAZOLE SODIUM 40 MG: 40 TABLET, DELAYED RELEASE ORAL at 09:28

## 2024-05-28 RX ADMIN — ASPIRIN 81 MG: 81 TABLET, COATED ORAL at 09:28

## 2024-05-28 SDOH — SOCIAL STABILITY: SOCIAL INSECURITY: ARE THERE ANY APPARENT SIGNS OF INJURIES/BEHAVIORS THAT COULD BE RELATED TO ABUSE/NEGLECT?: NO

## 2024-05-28 SDOH — SOCIAL STABILITY: SOCIAL INSECURITY: HAS ANYONE EVER THREATENED TO HURT YOUR FAMILY OR YOUR PETS?: NO

## 2024-05-28 SDOH — SOCIAL STABILITY: SOCIAL INSECURITY: WERE YOU ABLE TO COMPLETE ALL THE BEHAVIORAL HEALTH SCREENINGS?: YES

## 2024-05-28 SDOH — SOCIAL STABILITY: SOCIAL INSECURITY: DOES ANYONE TRY TO KEEP YOU FROM HAVING/CONTACTING OTHER FRIENDS OR DOING THINGS OUTSIDE YOUR HOME?: NO

## 2024-05-28 SDOH — SOCIAL STABILITY: SOCIAL INSECURITY: DO YOU FEEL ANYONE HAS EXPLOITED OR TAKEN ADVANTAGE OF YOU FINANCIALLY OR OF YOUR PERSONAL PROPERTY?: NO

## 2024-05-28 SDOH — SOCIAL STABILITY: SOCIAL INSECURITY: ARE YOU OR HAVE YOU BEEN THREATENED OR ABUSED PHYSICALLY, EMOTIONALLY, OR SEXUALLY BY ANYONE?: NO

## 2024-05-28 SDOH — SOCIAL STABILITY: SOCIAL INSECURITY: ABUSE: ADULT

## 2024-05-28 SDOH — SOCIAL STABILITY: SOCIAL INSECURITY: HAVE YOU HAD ANY THOUGHTS OF HARMING ANYONE ELSE?: NO

## 2024-05-28 SDOH — SOCIAL STABILITY: SOCIAL INSECURITY: DO YOU FEEL UNSAFE GOING BACK TO THE PLACE WHERE YOU ARE LIVING?: NO

## 2024-05-28 SDOH — SOCIAL STABILITY: SOCIAL INSECURITY: HAVE YOU HAD THOUGHTS OF HARMING ANYONE ELSE?: NO

## 2024-05-28 ASSESSMENT — LIFESTYLE VARIABLES
AUDIT-C TOTAL SCORE: 0
SKIP TO QUESTIONS 9-10: 1
HOW OFTEN DO YOU HAVE A DRINK CONTAINING ALCOHOL: NEVER
HOW OFTEN DO YOU HAVE 6 OR MORE DRINKS ON ONE OCCASION: NEVER
HOW MANY STANDARD DRINKS CONTAINING ALCOHOL DO YOU HAVE ON A TYPICAL DAY: PATIENT DOES NOT DRINK
AUDIT-C TOTAL SCORE: 0

## 2024-05-28 ASSESSMENT — PATIENT HEALTH QUESTIONNAIRE - PHQ9
1. LITTLE INTEREST OR PLEASURE IN DOING THINGS: NOT AT ALL
2. FEELING DOWN, DEPRESSED OR HOPELESS: NOT AT ALL
SUM OF ALL RESPONSES TO PHQ9 QUESTIONS 1 & 2: 0

## 2024-05-28 ASSESSMENT — COGNITIVE AND FUNCTIONAL STATUS - GENERAL
DAILY ACTIVITIY SCORE: 24
MOBILITY SCORE: 24
MOBILITY SCORE: 24
DAILY ACTIVITIY SCORE: 24
PATIENT BASELINE BEDBOUND: NO

## 2024-05-28 ASSESSMENT — ACTIVITIES OF DAILY LIVING (ADL)
TOILETING: INDEPENDENT
PATIENT'S MEMORY ADEQUATE TO SAFELY COMPLETE DAILY ACTIVITIES?: YES
DRESSING YOURSELF: INDEPENDENT
HEARING - LEFT EAR: FUNCTIONAL
LACK_OF_TRANSPORTATION: NO
ADEQUATE_TO_COMPLETE_ADL: YES
HEARING - RIGHT EAR: FUNCTIONAL
WALKS IN HOME: INDEPENDENT
BATHING: INDEPENDENT
GROOMING: INDEPENDENT
JUDGMENT_ADEQUATE_SAFELY_COMPLETE_DAILY_ACTIVITIES: YES
FEEDING YOURSELF: INDEPENDENT
LACK_OF_TRANSPORTATION: NO

## 2024-05-28 ASSESSMENT — PAIN SCALES - GENERAL: PAINLEVEL_OUTOF10: 0 - NO PAIN

## 2024-05-28 ASSESSMENT — ENCOUNTER SYMPTOMS: NAUSEA: 1

## 2024-05-28 NOTE — HOSPITAL COURSE
Syncope. He was ambulating to restroom when syncopal event occurred, he does endorse hitting his head. Was reported to be diaphoretic when prior to syncopal event. Per ED note EMS reported he was diaphoretic and bradycardic in 40's. He does have history of vasovagal syncope          PMHX: HTN, HLD,     Syncope  -CT head/spine neg  -telemetry  -orthostatics  - IVF  -echocardiogram  -US carotid   -CT PE neg   -hold hydrochlorothiazide  -holter at DC    HTN/HLD  -Resume home meds except for hydrochlorothiazide     DVT prophylaxis:  Lovenox, SCD    DC plan:  DC home when medically stable    Labs/Testing reviewed    Interdisciplinary team rounding completed with hospitalist, nurse, TCC    NP discussed plan and lab/testing results with Dr. Barboza

## 2024-05-28 NOTE — CARE PLAN
Problem: Safety - Adult  Goal: Free from fall injury  5/28/2024 0914 by Mary Jo Loera RN  Outcome: Progressing  5/28/2024 0912 by Mary Jo Loera RN  Outcome: Progressing     Problem: Chronic Conditions and Co-morbidities  Goal: Patient's chronic conditions and co-morbidity symptoms are monitored and maintained or improved  5/28/2024 0914 by Mary Jo Loera RN  Outcome: Progressing  5/28/2024 0912 by Mary Jo Loera RN  Flowsheets (Taken 5/27/2024 0500)  Care Plan - Patient's Chronic Conditions and Co-Morbidity Symptoms are Monitored and Maintained or Improved: Monitor and assess patient's chronic conditions and comorbid symptoms for stability, deterioration, or improvement   The patient's goals for the shift include remain safe    The clinical goals for the shift include obtain testing

## 2024-05-28 NOTE — PROGRESS NOTES
Home with wife      05/28/24 3488   Current Planned Discharge Disposition   Current Planned Discharge Disposition Home

## 2024-05-28 NOTE — ED PROVIDER NOTES
CC: Syncope     HPI:  Patient is a 69-year-old male with a past medical history of hypertension and hyperlipidemia who presents to the ED for syncope.  Patient noted having a syncopal episode while walking to the restroom at approximately 8 PM the day of ED presentation.  Patient was not noted to be urinary defecate before he had a syncopal episode.  Wife witnessed the patient's syncopal episode.  Patient was noted to fall and have a head strike.  Patient did note that he felt diaphoretic right before he passed out.  Patient only takes aspirin but no thinners.  Per EMS, patient noted to be bradycardic to the 40s as well as diaphoretic.  Patient now notes that he is at his baseline.  Patient notes mild head pressure.  Patient was notes that he drinks approximately 4 cups of water a day.  Patient not noted to have seizure-like activity.  Denies fevers, chills, nausea, vomiting, chest pain, difficulty breathing, abdominal pain, back pain, and extremity pain.    Limitations to history: None  Independent historian(s): Patient, wife, and EMS  Records Reviewed: Recent available ED and inpatient notes reviewed in EMR.    PMHx/PSHx:  Per HPI.   - has a past medical history of Abnormal weight gain (02/21/2014), Acute upper respiratory infection, unspecified (12/07/2013), Body mass index (BMI) 29.0-29.9, adult (05/03/2021), Encounter for general adult medical examination without abnormal findings (01/04/2014), Encounter for immunization (09/28/2020), Encounter for immunization (10/25/2013), Encounter for screening for malignant neoplasm of prostate (01/04/2014), Encounter for screening for other viral diseases (10/07/2013), Hyperlipidemia, Hypertension, Impacted cerumen, bilateral (12/03/2016), Impacted cerumen, left ear (07/03/2014), and Other conditions influencing health status (06/21/2013).  - has a past surgical history that includes [place] tooth extraction and Colonoscopy.    Medications:  Reviewed in EMR. See EMR for  complete list of medications and doses.    Allergies:  Patient has no known allergies.    Social History:  - Tobacco:  reports that he has never smoked. He has never used smokeless tobacco.   - Alcohol:  reports no history of alcohol use.   - Illicit Drugs:  reports no history of drug use.     ROS:  Per HPI.       ???????????????????????????????????????????????????????????????  Triage Vitals:  T 36.1 °C (97 °F)  HR 70  /79  RR 18  O2 99 %      Physical Exam    General: Patient resting comfortably in bed, no acute distress, breathing easily, well appearing, and appropriately conversational without confusion or gross mental status changes.  Head: Normocephalic. Atraumatic.    Neck: No midline cervical spine tenderness with palpation. FROM. No gross masses.   Eyes: EOMI. No scleral icterus or injection.  ENT: Dry mucous membranes, no apparent trauma or lesions.  No tongue laceration.  CV: Regular rhythm. No murmurs, rubs, gallops appreciated. 2+ radial pulses bilaterally.  Resp: Clear to auscultation bilaterally. No respiratory distress.   GI: Soft, non-distended. No tenderness with palpation. No rebound tenderness or guarding. No palpable masses.  : No suprapubic or CVA tenderness.  MSK: No gross step offs or deformities.  EXT: No peripheral edema, contusions, or wounds.  Skin: Warm and dry, no rashes or lesions.  Neuro: Alert and orientedx3. Cranial nerves II-XII grossly intact. No focal neurological deficits from stated baseline. Speech fluent.    ???????????????????????????????????????????????????????????????  Labs:   Labs Reviewed   CBC WITH AUTO DIFFERENTIAL   COMPREHENSIVE METABOLIC PANEL   MAGNESIUM   PHOSPHORUS   TROPONIN SERIES- (INITIAL, 1 HR)    Narrative:     The following orders were created for panel order Troponin Series, (0, 1 HR).  Procedure                               Abnormality         Status                     ---------                               -----------         ------                      Troponin I, High Sensiti...[[id number]]                                                 Troponin, High Sensitivi...[[id number]]                                                   Please view results for these tests on the individual orders.   B-TYPE NATRIURETIC PEPTIDE   D-DIMER, VTE EXCLUSION   URINALYSIS WITH REFLEX CULTURE AND MICROSCOPIC    Narrative:     The following orders were created for panel order Urinalysis with Reflex Culture and Microscopic.  Procedure                               Abnormality         Status                     ---------                               -----------         ------                     Urinalysis with Reflex C...[[id number]]                                                 Extra Urine Gray Tube[[id number]]                                                         Please view results for these tests on the individual orders.   SERIAL TROPONIN-INITIAL   URINALYSIS WITH REFLEX CULTURE AND MICROSCOPIC   EXTRA URINE GRAY TUBE   SERIAL TROPONIN, 1 HOUR        Imaging:   CT head wo IV contrast    (Results Pending)   CT cervical spine wo IV contrast    (Results Pending)        EKG:  Rate is 67, sinus rhythm, normal axis, no interval prolongation, no st elevation or depression.  When compared to EKG on 8/9/2022 review of EKG does not show any signs of STEMI, complete heart block, asystole, V-fib.    MDM:  Patient is a 69-year-old male with a past medical history of hypertension and hyperlipidemia who presents to the ED for syncope.  Patient presented HDS.  Physical exam findings significant for a 69-year-old male in no acute distress at his mental baseline.  Low clinical concern for seizure. Patient was ordered a liter of LR.   Metabolic panel significant for hypokalemia 3.1.  Potassium repleted.  Low clinical concern for ACS with unremarkable EKG and troponins.  CBC significant for no leukocytosis and mild anemia 13.1.  CT head nonspine acute intracranial process.  CT C-spine  [place] acute osseous process.  UA did not slight findings concerning for UTI.  D-dimer was elevated at 2082.  CT PE was ordered.  Upon my interpretation of the CT PE, no PE is noted at this time with radiology interpretation pending.  Orthostatics are unremarkable.  Lower clinical concern for neurogenic or orthostatic syncope.  Patient may have syncopized secondary to dehydration.  Discussed ED findings and admission with the patient for further syncope workup.  Patient stated understanding and agreement with the plan.  Discussed patient presentation with admitting team.  Patient to be admitted for further syncope workup.    ED Course:  Diagnoses as of 05/28/24 0142   Syncope, unspecified syncope type       Social Determinants Limiting Care:  None identified    Disposition:  Admission for syncope workup    Joce Segovia MD   Emergency Medicine PGY-2  [facility]    Comment: Please note this report has been produced using speech recognition software and may contain errors related to that system including errors in grammar, punctuation, and spelling as well as words and phrases that may be inappropriate.  If there are any questions or concerns please feel free to contact the dictating provider for clarification.    Procedures ? SmartLinks last updated 5/27/2024 9:46 PM        Joce Segovia MD  Resident  05/28/24 0143

## 2024-05-28 NOTE — DISCHARGE INSTRUCTIONS
[facility] Observation Unit Discharge Instructions  You came to the hospital with syncope and were admitted for observation and further care.   You were treated with cardiac monitoring, heart US (echocardiogram) carotid US, CT head, orthostatic vitals.    Your discharge plan is to go home to recover.    Please see your primary care doctor in 1 week for follow-up.   An appointment has been requested for you but may you need to call your doctors office to schedule.      For any issues or concerns with appointments, call the  scheduling line at [phone] or the providers office directly.       See the attached information for education about any new medications and the condition(s) you were treated for.  Your medications may have changed so pay close attention to the list given to you at discharge and ask any questions you have before leaving the hospital.

## 2024-05-28 NOTE — H&P
[facility] OBSERVATION H&P    Admitting Physician: Nii Barboza MD   Admitting Dx: Syncope, unspecified syncope type    HPI: Ariel Howard Jr. is a 69 y.o. male, with a PMH of HTN, HLD, vit D deficiency, and vasovagal syncope, who presented to [facility] ED on 5/27/2024 for syncope with LOC. Patient became syncopal while ambulating to the bathroom, then lost consciousness with fall and head strike on floor. Wife was present and witness the event. She reports no seizure-like activity during the event. Patient reports feeling diaphoretic prior to losing consciousness but reports no other preemptive symptoms. Per EMS, patient was bradycardic in the 40s and very diaphoretic upon arrival. He is not on any blood thinner except for ASA.    He reports on Friday, Saturday, and Sunday he was doing strenuous yard work. Yesterday he reports he went to lows and then took asmall nap in the basement. When he woke up he went upstairs and started feeling queasy (nauseous) at that time. He went to the restroom and urinated, no bowel movement. When he left the bathroom is when the syncopal event occurred. He endorses feeling queasy but denies any palpitations. Does not recall diaphoresis prior to event but remembers feeling hot. Reports he did not lose consciousness for long. He endorses drinking some water while doing yardwork and some yesterday, but states it probably wasn't enough    In the ED, VSS. EKG NSR with HR in the 70s, no ischemic changes. Labs notable for mild hypokalemia, normal troponin level, elevated ddimer. UA bland and chest CT negative for PE. CT head and C-spine negative for acute process. Orthostatic VS were also normal. Patient was given IVF for possible dehydration and admitted for further evaluation. Vasovagal syncope listed in PMH but unable to locate more information in chart review.    Subjective   Past Medical History:   Diagnosis Date    Abnormal weight gain 02/21/2014    Weight gain    Acute upper  respiratory infection, unspecified 12/07/2013    Acute upper respiratory infection    Body mass index (BMI) 29.0-29.9, adult 05/03/2021    BMI 29.0-29.9,adult    Encounter for general adult medical examination without abnormal findings 01/04/2014    Encounter for general health examination    Encounter for immunization 09/28/2020    Need for prophylactic vaccination and inoculation against influenza    Encounter for immunization 10/25/2013    Need for prophylactic vaccination with measles-mumps-rubella (MMR) vaccine    Encounter for screening for malignant neoplasm of prostate 01/04/2014    Encounter for screening for malignant neoplasm of prostate    Encounter for screening for other viral diseases 10/07/2013    Screening for viral disease    Hyperlipidemia     Hypertension     Impacted cerumen, bilateral 12/03/2016    Excessive cerumen in both ear canals    Impacted cerumen, left ear 07/03/2014    Impacted cerumen of left ear    Other conditions influencing health status 06/21/2013    Screening for malignant neoplasms, colon     Past Surgical History:   Procedure Laterality Date    COLONOSCOPY      WISDOM TOOTH EXTRACTION       Social History     Tobacco Use    Smoking status: Never    Smokeless tobacco: Never   Vaping Use    Vaping status: Never Used   Substance Use Topics    Alcohol use: Never    Drug use: Never     Family History   Problem Relation Name Age of Onset    Heart disease Paternal Grandmother      Heart disease Paternal Grandfather      Cancer Other      Hypertension Other      Other (Stroke syndrome) Other         No Known Allergies    Prior to Admission medications    Medication Sig Start Date End Date Taking? Authorizing Provider   aspirin 81 mg EC tablet TAKE 1 TABLET BY MOUTH EVERY DAY 11/7/23  Yes Harjinder Keller MD   atorvastatin (Lipitor) 20 mg tablet TAKE 1 TABLET BY MOUTH EVERY DAY 9/5/23  Yes Harjinder Keller MD   lisinopriL-hydrochlorothiazide 20-25 mg tablet TAKE 1 TABLET BY MOUTH EVERY  DAY 10/22/23  Yes Harjinder Keller MD   polyethylene glycol (Glycolax, Miralax) 17 gram/dose powder Take 17 g by mouth 1 time. 12/4/23   Historical Provider, MD   travoprost (Travatan Z) 0.004 % drops ophthalmic solution Administer 1 drop into both eyes once daily at bedtime. 3/1/23   Historical Provider, MD     Review of Systems   Gastrointestinal:  Positive for nausea.   Neurological:  Positive for syncope.   All other systems reviewed and are negative.         Objective   Heart Rate:  [65-90]   Temp:  [36.1 °C (97 °F)-36.2 °C (97.2 °F)]   Resp:  [16-29]   BP: ()/(51-99)   Height:  [182.9 cm (6')]   Weight:  [97.5 kg (214 lb 15.2 oz)-97.5 kg (215 lb)]   SpO2:  [94 %-99 %]      Pain Score: 0 - No pain   Vitals:    05/28/24 0530   Weight: 97.5 kg (214 lb 15.2 oz)        Intake/Output Summary (Last 24 hours) at 5/28/2024 0826  Last data filed at 5/27/2024 2253  Gross per 24 hour   Intake 999 ml   Output --   Net 999 ml       Physical Exam  Constitutional:       Appearance: Normal appearance.   Cardiovascular:      Rate and Rhythm: Normal rate and regular rhythm.      Pulses: Normal pulses.      Heart sounds: Normal heart sounds. No murmur heard.     No gallop.   Pulmonary:      Effort: Pulmonary effort is normal. No respiratory distress.      Breath sounds: Normal breath sounds. No wheezing or rhonchi.   Abdominal:      General: Abdomen is flat. Bowel sounds are normal. There is no distension.      Palpations: Abdomen is soft.      Tenderness: There is no abdominal tenderness. There is no guarding.   Musculoskeletal:         General: Normal range of motion.   Skin:     General: Skin is warm.      Capillary Refill: Capillary refill takes less than 2 seconds.   Neurological:      Mental Status: He is alert and oriented to person, place, and time.   Psychiatric:         Mood and Affect: Mood normal.         Thought Content: Thought content normal.         Judgment: Judgment normal.           Medications  aspirin,  81 mg, oral, Daily  atorvastatin, 20 mg, oral, Daily  latanoprost, 1 drop, Both Eyes, Nightly  lisinopril, 20 mg, oral, Daily  pantoprazole, 40 mg, oral, Daily before breakfast   Or  pantoprazole, 40 mg, intravenous, Daily before breakfast    potassium chloride in 0.9%NaCl, 100 mL/hr, Last Rate: 100 mL/hr (05/28/24 0507)    PRN medications: acetaminophen **OR** acetaminophen **OR** acetaminophen, ondansetron **OR** ondansetron    Labs  Results for orders placed or performed during the hospital encounter of 05/27/24 (from the past 24 hour(s))   CBC and Auto Differential   Result Value Ref Range    WBC 8.5 4.4 - 11.3 x10*3/uL    nRBC 0.0 0.0 - 0.0 /100 WBCs    RBC 5.05 4.50 - 5.90 x10*6/uL    Hemoglobin 13.1 (L) 13.5 - 17.5 g/dL    Hematocrit 39.5 (L) 41.0 - 52.0 %    MCV 78 (L) 80 - 100 fL    MCH 25.9 (L) 26.0 - 34.0 pg    MCHC 33.2 32.0 - 36.0 g/dL    RDW 14.7 (H) 11.5 - 14.5 %    Platelets 179 150 - 450 x10*3/uL    Neutrophils % 48.5 40.0 - 80.0 %    Immature Granulocytes %, Automated 0.2 0.0 - 0.9 %    Lymphocytes % 37.8 13.0 - 44.0 %    Monocytes % 8.9 2.0 - 10.0 %    Eosinophils % 4.1 0.0 - 6.0 %    Basophils % 0.5 0.0 - 2.0 %    Neutrophils Absolute 4.12 1.20 - 7.70 x10*3/uL    Immature Granulocytes Absolute, Automated 0.02 0.00 - 0.70 x10*3/uL    Lymphocytes Absolute 3.21 1.20 - 4.80 x10*3/uL    Monocytes Absolute 0.76 0.10 - 1.00 x10*3/uL    Eosinophils Absolute 0.35 0.00 - 0.70 x10*3/uL    Basophils Absolute 0.04 0.00 - 0.10 x10*3/uL   Comprehensive metabolic panel   Result Value Ref Range    Glucose 95 74 - 99 mg/dL    Sodium 140 136 - 145 mmol/L    Potassium 3.1 (L) 3.5 - 5.3 mmol/L    Chloride 102 98 - 107 mmol/L    Bicarbonate 29 21 - 32 mmol/L    Anion Gap 12 10 - 20 mmol/L    Urea Nitrogen 22 6 - 23 mg/dL    Creatinine 1.34 (H) 0.50 - 1.30 mg/dL    eGFR 58 (L) >60 mL/min/1.73m*2    Calcium 8.8 8.6 - 10.3 mg/dL    Albumin 3.7 3.4 - 5.0 g/dL    Alkaline Phosphatase 53 33 - 136 U/L    Total Protein 6.9  6.4 - 8.2 g/dL    AST 20 9 - 39 U/L    Bilirubin, Total 0.5 0.0 - 1.2 mg/dL    ALT 10 10 - 52 U/L   Magnesium   Result Value Ref Range    Magnesium 2.20 1.60 - 2.40 mg/dL   Phosphorus   Result Value Ref Range    Phosphorus 2.7 2.5 - 4.9 mg/dL   B-type natriuretic peptide   Result Value Ref Range    BNP 14 0 - 99 pg/mL   D-Dimer, VTE Exclusion   Result Value Ref Range    D-Dimer, Quantitative VTE Exclusion 2,082 (H) <=500 ng/mL FEU   Troponin I, High Sensitivity, Initial   Result Value Ref Range    Troponin I, High Sensitivity 5 0 - 20 ng/L   Troponin, High Sensitivity, 1 Hour   Result Value Ref Range    Troponin I, High Sensitivity 4 0 - 20 ng/L   Urinalysis with Reflex Culture and Microscopic   Result Value Ref Range    Color, Urine Light-Yellow Light-Yellow, Yellow, Dark-Yellow    Appearance, Urine Clear Clear    Specific Gravity, Urine 1.015 1.005 - 1.035    pH, Urine 6.0 5.0, 5.5, 6.0, 6.5, 7.0, 7.5, 8.0    Protein, Urine NEGATIVE NEGATIVE, 10 (TRACE), 20 (TRACE) mg/dL    Glucose, Urine Normal Normal mg/dL    Blood, Urine NEGATIVE NEGATIVE    Ketones, Urine NEGATIVE NEGATIVE mg/dL    Bilirubin, Urine NEGATIVE NEGATIVE    Urobilinogen, Urine Normal Normal mg/dL    Nitrite, Urine NEGATIVE NEGATIVE    Leukocyte Esterase, Urine NEGATIVE NEGATIVE   CBC   Result Value Ref Range    WBC 8.3 4.4 - 11.3 x10*3/uL    nRBC 0.0 0.0 - 0.0 /100 WBCs    RBC 4.78 4.50 - 5.90 x10*6/uL    Hemoglobin 12.3 (L) 13.5 - 17.5 g/dL    Hematocrit 37.7 (L) 41.0 - 52.0 %    MCV 79 (L) 80 - 100 fL    MCH 25.7 (L) 26.0 - 34.0 pg    MCHC 32.6 32.0 - 36.0 g/dL    RDW 14.6 (H) 11.5 - 14.5 %    Platelets 161 150 - 450 x10*3/uL   Basic metabolic panel   Result Value Ref Range    Glucose 143 (H) 74 - 99 mg/dL    Sodium 137 136 - 145 mmol/L    Potassium 3.3 (L) 3.5 - 5.3 mmol/L    Chloride 103 98 - 107 mmol/L    Bicarbonate 28 21 - 32 mmol/L    Anion Gap 9 (L) 10 - 20 mmol/L    Urea Nitrogen 18 6 - 23 mg/dL    Creatinine 1.26 0.50 - 1.30 mg/dL     eGFR 62 >60 mL/min/1.73m*2    Calcium 8.8 8.6 - 10.3 mg/dL       Assessment/Plan    Ariel Howard Jr. is a 69 y.o. male, with a PMH of HTN, HLD, vit D deficiency, and vasovagal syncope, who presented to [facility] ED on 5/27/2024 for syncope with LOC. Patient became syncopal while ambulating to the bathroom, then lost consciousness with fall and head strike on floor. Wife was present and witness the event. She reports no seizure-like activity during the event. Patient reports feeling diaphoretic prior to losing consciousness but reports no other preemptive symptoms. Per EMS, patient was bradycardic in the 40s and very diaphoretic upon arrival. He is not on any blood thinner except for ASA.    He reports on Friday, Saturday, and Sunday he was doing strenuous yard work. Yesterday he reports he went to lows and then took asmall nap in the basement. When he woke up he went upstairs and started feeling queasy (nauseous) at that time. He went to the restroom and urinated, no bowel movement. When he left the bathroom is when the syncopal event occurred. He endorses feeling queasy but denies any palpitations. Does not recall diaphoresis prior to event but remembers feeling hot. Reports he did not lose consciousness for long. He endorses drinking some water while doing yardwork and some yesterday, but states it probably wasn't enough    In the ED, VSS. EKG NSR with HR in the 70s, no ischemic changes. Labs notable for mild hypokalemia, normal troponin level, elevated ddimer. UA bland and chest CT negative for PE. CT head and C-spine negative for acute process. Orthostatic VS were also normal. Patient was given IVF for possible dehydration and admitted for further evaluation. Vasovagal syncope listed in PMH but unable to locate more information in chart review.    Syncope with Collapse  ?Hx of Vasovagal  Bradycardia per EMS  Hypokalemia  -VSS, orthostatic VS negative in ED  -No injury, at baseline mental status, neuro exam  WNL  -CTH and C-spine negative for acute process  -Ddimer 2082, chest CT negative for PE  -Troponin 5--4, BNP 14, TSH pending  -EKG NSR with HR 70s  -Tele until discharge, cards consult if bradycardia or arrhythmias occur. No events noted on tele from overnight.   -Echo, carotid US ordered  -Will need event monitor x2 weeks at discharge  -Replete electrolytes  -Gentle hydration for possible dehydration  -orthostatic vitals    HTN, HLD  -c/w statin and ASA  -Hold diuretics, continue Lisinopril    Other comorbidities as above  -Continue meds as ordered and adjust based on clinical course       GI/VTE PPX: PPI, SQ Lovenox  Code Status: Full Code    Chart, medical history, and labs/testing reviewed in detail.   Case and plan of care discussed with attending provider.      Disposition: Discharge home once medically cleared and stable, pending syncope workup    Labs/Testing reviewed    Interdisciplinary team rounding completed with hospitalist, nurse, TCC    NP discussed plan and lab/testing results with Dr. Jabari Machado   Observation/Internal Med JUAN  [facility]  05/28/24  8:26 AM  Total time of 45 minutes spent on professional and overall care, with >50% of time dedicated to counseling/coordination of care.

## 2024-05-28 NOTE — DISCHARGE SUMMARY
Discharge Diagnosis  Syncope, unspecified syncope type      Presented for syncope.  Likely vasovagal syncope secondary to excessive physical exertion in the heat and poor p.o. intake.  CT of the head negative.  Orthostatics negative.  Echocardiogram negative.  Ultrasound carotids negative.  No events on telemetry.  Stable to follow-up with PCP.      Issues Requiring Follow-Up  PCP    Discharge Meds     Your medication list        CONTINUE taking these medications        Instructions Last Dose Given Next Dose Due   aspirin 81 mg EC tablet      TAKE 1 TABLET BY MOUTH EVERY DAY       atorvastatin 20 mg tablet  Commonly known as: Lipitor      TAKE 1 TABLET BY MOUTH EVERY DAY       lisinopriL-hydrochlorothiazide 20-25 mg tablet      TAKE 1 TABLET BY MOUTH EVERY DAY       polyethylene glycol 17 gram/dose powder  Commonly known as: Glycolax, Miralax           travoprost 0.004 % drops ophthalmic solution  Commonly known as: Travatan Z                    Test Results Pending At Discharge  Pending Labs       Order Current Status    Extra Urine Gray Tube Collected (05/27/24 9418)    Urinalysis with Reflex Culture and Microscopic In process            Hospital Course    Ariel Howard Jr. is a 69 y.o. male, with a PMH of HTN, HLD, vit D deficiency, and vasovagal syncope, who presented to [facility] ED on 5/27/2024 for syncope with LOC. Patient became syncopal while ambulating to the bathroom, then lost consciousness with fall and head strike on floor. Wife was present and witness the event. She reports no seizure-like activity during the event. Patient reports feeling diaphoretic prior to losing consciousness but reports no other preemptive symptoms. Per EMS, patient was bradycardic in the 40s and very diaphoretic upon arrival. He is not on any blood thinner except for ASA.     He reports on Friday, Saturday, and Sunday he was doing strenuous yard work. Yesterday he reports he went to lows and then took asmall nap in the  basement. When he woke up he went upstairs and started feeling queasy (nauseous) at that time. He went to the restroom and urinated, no bowel movement. When he left the bathroom is when the syncopal event occurred. He endorses feeling queasy but denies any palpitations. Does not recall diaphoresis prior to event but remembers feeling hot. Reports he did not lose consciousness for long. He endorses drinking some water while doing yardwork and some yesterday, but states it probably wasn't enough     In the ED, VSS. EKG NSR with HR in the 70s, no ischemic changes. Labs notable for mild hypokalemia, normal troponin level, elevated ddimer. UA bland and chest CT negative for PE. CT head and C-spine negative for acute process. Orthostatic VS were also normal. Patient was given IVF for possible dehydration and admitted for further evaluation. Vasovagal syncope listed in PMH but unable to locate more information in chart review.     Syncope with Collapse  ?Hx of Vasovagal  Bradycardia per EMS  Hypokalemia  -VSS, orthostatic VS negative in ED  -No injury, at baseline mental status, neuro exam WNL  -CTH and C-spine negative for acute process  -Ddimer 2082, chest CT negative for PE  -Troponin 5--4, BNP 14, TSH pending  -EKG NSR with HR 70s  -Tele until discharge, cards consult if bradycardia or arrhythmias occur. No events noted on tele from overnight.   -Echo, carotid US ordered  -Will need event monitor x2 weeks at discharge  -Replete electrolytes  -Gentle hydration for possible dehydration  -orthostatic vitals     HTN, HLD  -c/w statin and ASA  -Hold diuretics, continue Lisinopril     Other comorbidities as above  -Continue meds as ordered and adjust based on clinical course         GI/VTE PPX: PPI, SQ Lovenox  Code Status: Full Code     Chart, medical history, and labs/testing reviewed in detail.   Case and plan of care discussed with attending provider.        Disposition: Discharge home once medically cleared and stable,  pending syncope workup     Labs/Testing reviewed     Interdisciplinary team rounding completed with hospitalist, nurse, TCC     NP discussed plan and lab/testing results with Dr. Barboza     Pertinent Physical Exam At Time of Discharge  Physical Exam    Outpatient Follow-Up  Future Appointments   Date Time Provider Department Center   10/31/2024 11:30 AM Harjinder Keller MD [id number] Rojas Machado, APRN-CNP

## 2024-05-28 NOTE — PROGRESS NOTES
Transitional Care Coordination Progress Note:  Plan per Medical/Surgical team: treatment of syncope with ECHO pending   Status: Observation  Payor source: [employer]  Discharge disposition: Home with wife   Potential Barriers: HR 40's  ADOD: 5/29/2024   KRYSTAL Randhawa RN, BSN Transitional Care Coordinator ED# [phone]      05/28/24 0849   Discharge Planning   Living Arrangements Spouse/significant other   Support Systems Spouse/significant other   Assistance Needed cardio work up, ECHO pending   Type of Residence Private residence   Number of Stairs to Enter Residence 3   Number of Stairs Within Residence 12   Home or Post Acute Services None   Patient expects to be discharged to: Home with wife   Does the patient need discharge transport arranged? No   Financial Resource Strain   How hard is it for you to pay for the very basics like food, housing, medical care, and heating? Not hard   Housing Stability   In the last 12 months, was there a time when you were not able to pay the mortgage or rent on time? N   In the last 12 months, how many places have you lived? 1   In the last 12 months, was there a time when you did not have a steady place to sleep or slept in a shelter (including now)? N   Transportation Needs   In the past 12 months, has lack of transportation kept you from medical appointments or from getting medications? no   In the past 12 months, has lack of transportation kept you from meetings, work, or from getting things needed for daily living? No

## 2024-05-28 NOTE — ED TRIAGE NOTES
Pt presents today via EMS for a syncopal episode today. Pt stated he hit his head, takes ASA but denies thinners. +LOC, does not remember the episode. EMS stated pt was yue in the 40's and very diaphoretic upon arrival. Hx HTN.

## 2024-05-28 NOTE — PROGRESS NOTES
05/28/24 0826   [facility] Disability Status   Are you deaf or do you have serious difficulty hearing? N   Are you blind or do you have serious difficulty seeing, even when wearing glasses? N   Because of a physical, mental, or emotional condition, do you have serious difficulty concentrating, remembering, or making decisions? (5 years old or older) N   Do you have serious difficulty walking or climbing stairs? N   Do you have serious difficulty dressing or bathing? N   Because of a physical, mental, or emotional condition, do you have serious difficulty doing errands alone such as visiting the doctor? N

## 2024-05-28 NOTE — PROGRESS NOTES
Pharmacy Medication History Review    Ariel Howard Jr. is a 69 y.o. male admitted for Syncope, unspecified syncope type. Pharmacy reviewed the patient's [id number] medications and allergies for accuracy.    Prior to Admission Medications   Prescriptions Last Dose Informant   aspirin 81 mg EC tablet 5/27/2024 at 0730    Sig: TAKE 1 TABLET BY MOUTH EVERY DAY   atorvastatin (Lipitor) 20 mg tablet 5/27/2024 at 0730    Sig: TAKE 1 TABLET BY MOUTH EVERY DAY   lisinopriL-hydrochlorothiazide 20-25 mg tablet 5/27/2024 at 0730    Sig: TAKE 1 TABLET BY MOUTH EVERY DAY   travoprost (Travatan Z) 0.004 % drops ophthalmic solution 5/26/2024 at 2300    Sig: Administer 1 drop into both eyes once daily at bedtime.      Facility-Administered Medications: None     Brandan Coleman, PharmD

## 2024-05-28 NOTE — PROGRESS NOTES
05/28/24 1233   Discharge Planning   Living Arrangements Spouse/significant other   Support Systems Spouse/significant other   Assistance Needed None   Type of Residence Private residence   Home or Post Acute Services None   Patient expects to be discharged to: Home     Met with patient and wife at bedside to discuss discharge plans.  Patient denies any needs and plans to return home with his wife upon discharge.  Holter monitor to be placed prior to discharge.  Waiting on ECHO report.

## 2024-05-30 ENCOUNTER — PATIENT OUTREACH (OUTPATIENT)
Dept: PRIMARY CARE | Facility: CLINIC | Age: 69
End: 2024-05-30
Payer: MEDICARE

## 2024-05-30 NOTE — PROGRESS NOTES
Discharge Facility: [facility]  Discharge Diagnosis: Syncope  Admission Date: 5-  Discharge Date: 5-    PCP Appointment Date:  No available TCM appointments.  will outreach the clinical pool /office for scheduling assistance    Specialist Appointment Date: NA  Hospital Encounter and Summary: Linked   See discharge assessment below for further details  Medications  Medications reviewed with patient/caregiver?: Yes (5/30/2024  1:23 PM)  Is the patient having any side effects they believe may be caused by any medication additions or changes?: No (5/30/2024  1:23 PM)  Does the patient have all medications ordered at discharge?: Yes (5/30/2024  1:23 PM)  Care Management Interventions: Provided patient education (5/30/2024  1:23 PM)  Prescription Comments: NO MEDICATION CHANGES AT DISCHARGE (5/30/2024  1:23 PM)  Is the patient taking all medications as directed (includes completed medication regime)?: Yes (5/30/2024  1:23 PM)  Care Management Interventions: Provided patient education (5/30/2024  1:23 PM)  Medication Comments: Patient denies any questions or concerns about their medications once they are home. (5/30/2024  1:23 PM)    Appointments  Does the patient have a primary care provider?: Yes (5/30/2024  1:23 PM)  Care Management Interventions: Educated patient on importance of making appointment (No available TCM appointments.  will outreach the clinical pool /office for scheduling assistance) (5/30/2024  1:23 PM)  Has the patient kept scheduled appointments due by today?: Yes (5/30/2024  1:23 PM)    Self Management  What is the home health agency?: NA (5/30/2024  1:23 PM)  Has home health visited the patient within 72 hours of discharge?: Not applicable (5/30/2024  1:23 PM)  What Durable Medical Equipment (DME) was ordered?: HOLTER MONITOR PLACED PRIOR TO DISCHARGE - TO WEAR FOR 2 WEEKS. (5/30/2024  1:23 PM)  Has all Durable Medical Equipment (DME) been delivered?: No (5/30/2024  1:23  PM)    Patient Teaching  Does the patient have access to their discharge instructions?: Yes (2024  1:23 PM)  Care Management Interventions: Reviewed instructions with patient (2024  1:23 PM)  What is the patient's perception of their health status since discharge?: Improving (2024  1:23 PM)  Is the patient/caregiver able to teach back the hierarchy of who to call/visit for symptoms/problems? PCP, Specialist, Home Health nurse, Urgent Care, ED, 911: Yes (2024  1:23 PM)  Patient/Caregiver Education Comments: DENIES CHEST PAIN, SHORTNESS OF BREATH, LIGHTHEADEDNESS OR DIZZINESS.  ALERT, APPROPRIATE. (2024  1:23 PM)    Wrap Up  Wrap Up Additional Comments: Spoke w/ pt. Pt identified by name and .     Reviewed discharge instructions, medications, and follow up. Patient denies any further discharge questions/needs at this time.Please take all medications as prescribed and keep all follow-up appointments. Emphasized the importance of hospital follow up.Follow up is needed after discharge to review the hospital recommendations,assess your response to your treatment and make further recommendations for your transition of care.                                                                                                                                                                                                                  Contact your primary care physician with any questions or concerns that arise. Aware of my availability for non emergent concerns. Office to outreach and schedule follow up appt. (2024  1:23 PM)

## 2024-06-10 ENCOUNTER — OFFICE VISIT (OUTPATIENT)
Dept: PRIMARY CARE | Facility: HOSPITAL | Age: 69
End: 2024-06-10
Payer: MEDICARE

## 2024-06-10 VITALS
BODY MASS INDEX: 29.16 KG/M2 | HEIGHT: 72 IN | HEART RATE: 91 BPM | DIASTOLIC BLOOD PRESSURE: 84 MMHG | OXYGEN SATURATION: 97 % | WEIGHT: 215.3 LBS | TEMPERATURE: 97.6 F | SYSTOLIC BLOOD PRESSURE: 132 MMHG | RESPIRATION RATE: 18 BRPM

## 2024-06-10 DIAGNOSIS — I10 BENIGN ESSENTIAL HYPERTENSION: Primary | ICD-10-CM

## 2024-06-10 DIAGNOSIS — R55 SYNCOPE, UNSPECIFIED SYNCOPE TYPE: ICD-10-CM

## 2024-06-10 PROCEDURE — 99214 OFFICE O/P EST MOD 30 MIN: CPT | Performed by: INTERNAL MEDICINE

## 2024-06-10 PROCEDURE — 1170F FXNL STATUS ASSESSED: CPT | Performed by: INTERNAL MEDICINE

## 2024-06-10 PROCEDURE — 1126F AMNT PAIN NOTED NONE PRSNT: CPT | Performed by: INTERNAL MEDICINE

## 2024-06-10 PROCEDURE — 1036F TOBACCO NON-USER: CPT | Performed by: INTERNAL MEDICINE

## 2024-06-10 PROCEDURE — 1159F MED LIST DOCD IN RCRD: CPT | Performed by: INTERNAL MEDICINE

## 2024-06-10 PROCEDURE — 3079F DIAST BP 80-89 MM HG: CPT | Performed by: INTERNAL MEDICINE

## 2024-06-10 PROCEDURE — 1160F RVW MEDS BY RX/DR IN RCRD: CPT | Performed by: INTERNAL MEDICINE

## 2024-06-10 PROCEDURE — 3075F SYST BP GE 130 - 139MM HG: CPT | Performed by: INTERNAL MEDICINE

## 2024-06-10 ASSESSMENT — ACTIVITIES OF DAILY LIVING (ADL)
BATHING: INDEPENDENT
JUDGMENT_ADEQUATE_SAFELY_COMPLETE_DAILY_ACTIVITIES: YES
WALKS IN HOME: INDEPENDENT
FEEDING YOURSELF: INDEPENDENT
DRESSING YOURSELF: INDEPENDENT
TOILETING: INDEPENDENT
HEARING - LEFT EAR: FUNCTIONAL
ADEQUATE_TO_COMPLETE_ADL: YES
GROOMING: INDEPENDENT
PATIENT'S MEMORY ADEQUATE TO SAFELY COMPLETE DAILY ACTIVITIES?: YES
HEARING - RIGHT EAR: FUNCTIONAL

## 2024-06-10 ASSESSMENT — PATIENT HEALTH QUESTIONNAIRE - PHQ9
1. LITTLE INTEREST OR PLEASURE IN DOING THINGS: NOT AT ALL
SUM OF ALL RESPONSES TO PHQ9 QUESTIONS 1 AND 2: 0
2. FEELING DOWN, DEPRESSED OR HOPELESS: NOT AT ALL

## 2024-06-10 ASSESSMENT — ENCOUNTER SYMPTOMS
LOSS OF SENSATION IN FEET: 0
ACTIVITY CHANGE: 0
SHORTNESS OF BREATH: 0
MYALGIAS: 0
PALPITATIONS: 0
DIARRHEA: 0
OCCASIONAL FEELINGS OF UNSTEADINESS: 0
DEPRESSION: 0
CHILLS: 0
NAUSEA: 0
CHEST TIGHTNESS: 0
AGITATION: 0
WEAKNESS: 0

## 2024-06-10 ASSESSMENT — PAIN SCALES - GENERAL: PAINLEVEL: 0-NO PAIN

## 2024-06-10 NOTE — PROGRESS NOTES
Subjective   Patient ID: Ariel Howard Jr. is a 69 y.o. male who presents for Follow-up. He went to the ED after passing out at home.  He presents with his wife who lives near him when he passed out.  He had done a lot of mulch and thought he was okay.  He had then gone to the toilet and attempts to have a bowel movement, but he did not have a bowel movement.  He returned to the kitchen and then passed out in his head rather hard on the floor.  His wife called 911 and they went to the emergency department.  Once there are number of lab test were drawn as well as radiologic and exams.    HPI     Review of Systems   Constitutional:  Negative for activity change and chills.   Respiratory:  Negative for chest tightness and shortness of breath.    Cardiovascular:  Negative for chest pain and palpitations.   Gastrointestinal:  Negative for diarrhea and nausea.   Musculoskeletal:  Negative for myalgias.   Neurological:  Negative for weakness.   Psychiatric/Behavioral:  Negative for agitation and behavioral problems.        Objective   /84   Pulse 91   Temp 36.4 °C (97.6 °F)   Resp 18   Ht 1.829 m (6')   Wt 97.7 kg (215 lb 4.8 oz)   SpO2 97%   BMI 29.20 kg/m²     Physical Exam  Constitutional:       Appearance: Normal appearance.   HENT:      Head: Normocephalic and atraumatic.      Nose: Nose normal.      Mouth/Throat:      Mouth: Mucous membranes are moist.   Eyes:      Extraocular Movements: Extraocular movements intact.      Pupils: Pupils are equal, round, and reactive to light.   Cardiovascular:      Rate and Rhythm: Normal rate and regular rhythm.   Pulmonary:      Effort: No respiratory distress.      Breath sounds: No wheezing.   Abdominal:      General: Bowel sounds are normal.      Palpations: Abdomen is soft.   Neurological:      General: No focal deficit present.       Assessment/Plan   Problem List Items Addressed This Visit             ICD-10-CM    Benign essential hypertension - Primary I10     Syncope, unspecified syncope type R55     He has had no issues since the event.  He was placed in a cardiac monitor which will end soon.  He will follow-up with cardiology so they can give him some advice as to what caused this event.  We are fairly certain that this was a vasovagal event, but it is best to have his cardiologist do a full workup.

## 2024-06-11 ENCOUNTER — PATIENT OUTREACH (OUTPATIENT)
Dept: PRIMARY CARE | Facility: CLINIC | Age: 69
End: 2024-06-11
Payer: MEDICARE

## 2024-06-11 NOTE — PROGRESS NOTES
Unable to reach patient for call back after patient's follow up appointment with PCP.   LVM with call back number for patient to call if needed.

## 2024-06-18 LAB — BODY SURFACE AREA: 2.23 M2

## 2024-07-09 ENCOUNTER — OFFICE VISIT (OUTPATIENT)
Dept: CARDIOLOGY | Facility: CLINIC | Age: 69
End: 2024-07-09
Payer: MEDICARE

## 2024-07-09 ENCOUNTER — PATIENT OUTREACH (OUTPATIENT)
Dept: PRIMARY CARE | Facility: CLINIC | Age: 69
End: 2024-07-09

## 2024-07-09 VITALS
SYSTOLIC BLOOD PRESSURE: 132 MMHG | HEART RATE: 83 BPM | HEIGHT: 72 IN | BODY MASS INDEX: 28.97 KG/M2 | OXYGEN SATURATION: 98 % | DIASTOLIC BLOOD PRESSURE: 81 MMHG | WEIGHT: 213.9 LBS

## 2024-07-09 DIAGNOSIS — R55 SYNCOPE, UNSPECIFIED SYNCOPE TYPE: ICD-10-CM

## 2024-07-09 DIAGNOSIS — I10 BENIGN ESSENTIAL HYPERTENSION: ICD-10-CM

## 2024-07-09 PROCEDURE — 1159F MED LIST DOCD IN RCRD: CPT | Performed by: INTERNAL MEDICINE

## 2024-07-09 PROCEDURE — 93005 ELECTROCARDIOGRAM TRACING: CPT | Performed by: INTERNAL MEDICINE

## 2024-07-09 PROCEDURE — 1036F TOBACCO NON-USER: CPT | Performed by: INTERNAL MEDICINE

## 2024-07-09 PROCEDURE — 1126F AMNT PAIN NOTED NONE PRSNT: CPT | Performed by: INTERNAL MEDICINE

## 2024-07-09 PROCEDURE — 3079F DIAST BP 80-89 MM HG: CPT | Performed by: INTERNAL MEDICINE

## 2024-07-09 PROCEDURE — 99204 OFFICE O/P NEW MOD 45 MIN: CPT | Performed by: INTERNAL MEDICINE

## 2024-07-09 PROCEDURE — 3075F SYST BP GE 130 - 139MM HG: CPT | Performed by: INTERNAL MEDICINE

## 2024-07-09 PROCEDURE — 99214 OFFICE O/P EST MOD 30 MIN: CPT | Performed by: INTERNAL MEDICINE

## 2024-07-09 ASSESSMENT — ENCOUNTER SYMPTOMS
LOSS OF SENSATION IN FEET: 0
DEPRESSION: 0
OCCASIONAL FEELINGS OF UNSTEADINESS: 0

## 2024-07-09 ASSESSMENT — PAIN SCALES - GENERAL: PAINLEVEL: 0-NO PAIN

## 2024-07-09 NOTE — PROGRESS NOTES
Referred by Harjinder Massey MD provider found for No chief complaint on file.       Ariel Howard Jr. is a 69 y.o. year old male patient with h/o HTN, HLD, vit D deficiency, and vasovagal syncope. Was recently admitted to the ER due to syncope right after physical activity and attempt to bowel movement in the bathroom. He was diaphoretic just before the episode.  Had an echocardiogram which showed a normal heart with a preserved LVEF. Also had a heart monitor which did not show any sigificant arrhythmia. Was referred to me for evaluation.     PMHx/PSHx: As above    FamHx: unremarkable     Allergies:  No Known Allergies     Review of Systems    Constitutional: not feeling tired.   Eyes: no eyesight problems.   ENT: no hearing loss and no nosebleeds.   Cardiovascular: no intermittent leg claudication and as noted in HPI.   Respiratory: no chronic cough and no shortness of breath.   Gastrointestinal: no change in bowel habits and no blood in stools.   Genitourinary: no urinary frequency and no hematuria.   Skin: no skin rashes.   Neurological: no seizures and no frequent falls.   Psychiatric: no depression and not suicidal.   All other systems have been reviewed and are negative for complaint.     Outpatient Medications:  Current Outpatient Medications   Medication Instructions    aspirin 81 mg, oral, Daily    atorvastatin (LIPITOR) 20 mg, oral, Daily    lisinopriL-hydrochlorothiazide 20-25 mg tablet 1 tablet, oral, Daily    polyethylene glycol (GLYCOLAX, MIRALAX) 17 g, oral, Once    travoprost (Travatan Z) 0.004 % drops ophthalmic solution 1 drop, Both Eyes, Nightly         Last Recorded Vitals:      5/28/2024     4:30 AM 5/28/2024     5:30 AM 5/28/2024    10:33 AM 5/28/2024    10:34 AM 5/28/2024    10:35 AM 5/28/2024     4:19 PM 6/10/2024    10:26 AM   Vitals   Systolic 120 128 113 136 142 131 132   Diastolic 99 91 83 84 88 85 84   Heart Rate 68 70 86 82 86 68 91   Temp  36.2 °C (97.2 °F) 36.4 °C (97.5 °F)    36.1 °C (97 °F) 36.4 °C (97.6 °F)   Resp 20 16 17 17 18 17 18   Height (in)  1.829 m (6')     1.829 m (6')   Weight (lb)  214.95     215.3   BMI  29.15 kg/m2     29.2 kg/m2   BSA (m2)  2.23 m2     2.23 m2   Visit Report       Report    Visit Vitals  Smoking Status Never        Physical Exam:  Constitutional: alert and in no acute distress.   Eyes: no erythema, swelling or discharge from the eye .   Neck: neck is supple, symmetric, trachea midline, no masses  and no thyromegaly .   Pulmonary: no increased work of breathing or signs of respiratory distress  and lungs clear to auscultation.    Cardiovascular: carotid pulses 2+ bilaterally with no bruit , JVP was normal, no thrills , regular rhythm, normal S1 and S2, no murmurs , pedal pulses 2+ bilaterally  and no edema .   Abdomen: abdomen non-tender, no masses  and no hepatomegaly .   Skin: skin warm and dry, normal skin turgor .   Psychiatric judgment and insight is normal  and oriented to person, place and time .        Assessment/Plan   Problem List Items Addressed This Visit             ICD-10-CM    Syncope, unspecified syncope type R55       Ariel Howard Jr. is a 69 y.o. year old male patient with h/o HTN, HLD, vit D deficiency, and vasovagal syncope. Was recently admitted to the ER due to syncope right after physical activity and attempt to bowel movement in the bathroom. He was diaphoretic just before the episode.  Had an echocardiogram which showed a normal heart with a preserved LVEF. Also had a heart monitor which did not show any sigificant arrhythmia. Was referred to me for evaluation.   His current ECG shows NSR with narrow QRS and HR of 72 bpm. His episode is consistent with vasovagal syncope. Will request a tlt table test and follow up with the results.         Bernard Hightower MD  Cardiac Electrophysiology      Thank you very much for allowing me to participate in the care of this pleasant patient. Please do not hesitate to contact me with any  further questions or concerns regarding his care.    **Disclaimer: This note was dictated by speech recognition, and every effort has been made to prevent any error in transcription, however minor errors may be present**

## 2024-07-10 LAB
ATRIAL RATE: 72 BPM
P AXIS: 17 DEGREES
P OFFSET: 204 MS
P ONSET: 149 MS
PR INTERVAL: 154 MS
Q ONSET: 226 MS
QRS COUNT: 12 BEATS
QRS DURATION: 82 MS
QT INTERVAL: 386 MS
QTC CALCULATION(BAZETT): 422 MS
QTC FREDERICIA: 410 MS
R AXIS: -11 DEGREES
T AXIS: 8 DEGREES
T OFFSET: 419 MS
VENTRICULAR RATE: 72 BPM

## 2024-07-10 NOTE — PROGRESS NOTES
[employer]    Dear Ariel Howard Jr.,    I hope this message finds you well.   As your care manager, I work with your provider, Harjinder Keller MD  to help make sure you stay healthy at home.  I recall our phone conversation after your discharge from [facility] and hope you are doing well.   How are you feeling since you have been home? Are there any changes or do you have any concerns about your health?   I am committed to providing you with the support and resources you need.   If you have questions regarding your health or need help with resources, I am available to assist you.   Thank you for entrusting [facility] with your healthcare needs. It is our pleasure to assist you on your healthcare journey towards better health.    Stay Well,  Kimberlee LANDON BSN, RN  Transitional Care Manager  [phone]

## 2024-07-19 DIAGNOSIS — E78.5 HYPERLIPIDEMIA, UNSPECIFIED: ICD-10-CM

## 2024-07-19 DIAGNOSIS — R55 SYNCOPE AND COLLAPSE: Primary | ICD-10-CM

## 2024-07-19 RX ORDER — ATORVASTATIN CALCIUM 20 MG/1
20 TABLET, FILM COATED ORAL DAILY
Qty: 90 TABLET | Refills: 3 | Status: SHIPPED | OUTPATIENT
Start: 2024-07-19

## 2024-08-22 ENCOUNTER — PATIENT OUTREACH (OUTPATIENT)
Dept: PRIMARY CARE | Facility: CLINIC | Age: 69
End: 2024-08-22
Payer: MEDICARE

## 2024-09-04 ENCOUNTER — APPOINTMENT (OUTPATIENT)
Dept: DERMATOLOGY | Facility: CLINIC | Age: 69
End: 2024-09-04
Payer: MEDICARE

## 2024-09-04 DIAGNOSIS — D48.5 NEOPLASM OF UNCERTAIN BEHAVIOR OF SKIN: Primary | ICD-10-CM

## 2024-09-04 DIAGNOSIS — D23.9 DERMATOFIBROMA: ICD-10-CM

## 2024-09-04 PROCEDURE — 99213 OFFICE O/P EST LOW 20 MIN: CPT | Performed by: DERMATOLOGY

## 2024-09-04 PROCEDURE — 1160F RVW MEDS BY RX/DR IN RCRD: CPT | Performed by: DERMATOLOGY

## 2024-09-04 PROCEDURE — 1159F MED LIST DOCD IN RCRD: CPT | Performed by: DERMATOLOGY

## 2024-09-04 PROCEDURE — 1036F TOBACCO NON-USER: CPT | Performed by: DERMATOLOGY

## 2024-09-04 NOTE — PROGRESS NOTES
Subjective     Ariel Howard Jr. is a 69 y.o. male who presents for the following: Suspicious Skin Lesion (Forehead lesion and right upper leg lesion. Pt denies personal history of skin cancer.  Chaperone offered and declined.  ).     Review of Systems:  No other skin or systemic complaints other than what is documented elsewhere in the note.    The following portions of the chart were reviewed this encounter and updated as appropriate:   Tobacco  Allergies  Meds  Problems  Med Hx  Surg Hx  Fam Hx         Skin Cancer History  No skin cancer on file.      Specialty Problems          Dermatology Problems    Eczema        Objective   Well appearing patient in no apparent distress; mood and affect are within normal limits.    A focused skin examination was performed. All findings within normal limits unless otherwise noted below.    Assessment/Plan   1. Neoplasm of uncertain behavior of skin  Mid Forehead  11mm x 10mm irregularly shaped irregularly pigmented patch with superior papular component; under the dermatoscope, the superior area has a cobblestone appearance with irregular blood vessels; clinically lichenified                  -Patient states noticed the lesion 1.5 months ago  -Given irregularity on dermoscopy and clinically, I recommend biopsy. However, patient keloids. After discussion, patient declines biopsy today and opts for short term monitoring  -Clinical photograph taken today  -Re check lesion in 3 months time. If any changes, encouraged patient to reconsider biopsy  -Try to avoid manipulation, friction of the area so that dermoscopy can offer better visualization at next visit    2. Dermatofibroma  Right Thigh - Anterior  Firm hyperpigmented papule with positive dimple sign. On dermoscopy, there is a central scar-like area with a uniform peripheral pigment network.    -Discussed nature of condition  -Reassurance, recommend observation  -Recommend that the patient avoid  trauma/injury/manipulation to the area to avoid the lesion enlarging  -Recommend the patient return for re-evaluation if the lesion enlarges, becomes painful or symptomatic, or is changing        Follow up in 3 months for lesion re check and potential biopsy  Discussed if there are any changes or development of concerning symptoms (lesion/skin condition is changing, bleeding, enlarging, or worsening) the patient is to contact my office. The patient verbalizes understanding.    Bell Serna MD  9/4/2024

## 2024-09-09 ENCOUNTER — TELEPHONE (OUTPATIENT)
Dept: DERMATOLOGY | Facility: CLINIC | Age: 69
End: 2024-09-09
Payer: MEDICARE

## 2024-09-09 NOTE — TELEPHONE ENCOUNTER
Pt contacted office and is interested in receiving biopsy to spot on scalp.  Pt would like to be scheduled in [place] or [place].    Can someone please reach out to the patient to schedule him for this procedure at [place] or [place]?    Thank you!    Erlin Aponte LPN

## 2024-09-10 ENCOUNTER — APPOINTMENT (OUTPATIENT)
Dept: DERMATOLOGY | Facility: CLINIC | Age: 69
End: 2024-09-10
Payer: MEDICARE

## 2024-09-11 ENCOUNTER — OFFICE VISIT (OUTPATIENT)
Dept: DERMATOLOGY | Facility: CLINIC | Age: 69
End: 2024-09-11
Payer: MEDICARE

## 2024-09-11 DIAGNOSIS — L30.8 ASTEATOTIC ECZEMA: Primary | ICD-10-CM

## 2024-09-11 DIAGNOSIS — D48.5 NEOPLASM OF UNCERTAIN BEHAVIOR OF SKIN: ICD-10-CM

## 2024-09-11 DIAGNOSIS — L82.1 SEBORRHEIC KERATOSIS: ICD-10-CM

## 2024-09-11 DIAGNOSIS — D22.9 MELANOCYTIC NEVUS, UNSPECIFIED LOCATION: ICD-10-CM

## 2024-09-11 DIAGNOSIS — L57.8 DIFFUSE PHOTODAMAGE OF SKIN: ICD-10-CM

## 2024-09-11 DIAGNOSIS — D18.01 HEMANGIOMA OF SKIN: ICD-10-CM

## 2024-09-11 RX ORDER — TRIAMCINOLONE ACETONIDE 0.25 MG/G
CREAM TOPICAL
Qty: 80 G | Refills: 2 | Status: SHIPPED | OUTPATIENT
Start: 2024-09-11

## 2024-09-11 NOTE — PROGRESS NOTES
Subjective     Ariel Howard Jr. is a 69 y.o. male who presents for the following: Suspicious Skin Lesion.  He reports he had a lesion on his forehead evaluated by Dr. Gutierrez earlier this month, and it was recommended he undergo biopsy of the lesion on his forehead, but he declined biopsy at that time and presents today for re-evaluation and consideration of possible biopsy.    He reports the lesion has been present for approximately 2 months and appeared suddenly and has become more red, raised, and slightly itchy.  He also reports in the winter he sometmes gets dry, itchy skin with a red scaly rash, especially on his legs.  He denies any other new, changing, or concerning skin lesions; no bleeding, itching, or burning lesions.      Review of Systems:  No other skin or systemic complaints other than what is documented elsewhere in the note.    The following portions of the chart were reviewed this encounter and updated as appropriate:       Skin Cancer History  No skin cancer on file.    Specialty Problems          Dermatology Problems    Eczema       Past Dermatologic / Past Relevant Medical History:    No history of skin cancer, eczema, or psoriasis    Family History:    No family history of melanoma or skin cancer    Social History:    The patient states he is retired from working as a  in [place]    Allergies:  Patient has no known allergies.    Current Medications / CAM's:    Current Outpatient Medications:     aspirin 81 mg EC tablet, TAKE 1 TABLET BY MOUTH EVERY DAY, Disp: 90 tablet, Rfl: 3    atorvastatin (Lipitor) 20 mg tablet, TAKE 1 TABLET BY MOUTH EVERY DAY, Disp: 90 tablet, Rfl: 3    lisinopriL-hydrochlorothiazide 20-25 mg tablet, TAKE 1 TABLET BY MOUTH EVERY DAY, Disp: 90 tablet, Rfl: 3    travoprost (Travatan Z) 0.004 % drops ophthalmic solution, Administer 1 drop into both eyes once daily at bedtime., Disp: , Rfl:     triamcinolone (Kenalog) 0.025 %  cream, Apply twice daily to affected areas of body (avoid face, groin, body folds) for 2 weeks, then taper to twice daily on weekends only; repeat every 6-8 weeks as needed for flares, Disp: 80 g, Rfl: 2     Objective   Well appearing patient in no apparent distress; mood and affect are within normal limits.    A full examination was performed including scalp, face, eyes, ears, nose, lips, neck, chest, axillae, abdomen, back, bilateral upper extremities, and bilateral lower extremities. All findings within normal limits unless otherwise noted below.    Assessment/Plan   1. Asteatotic eczema  On his bilateral legs, there are similar-appearing erythematous, scaly patches with a dry or cracked riverbed appearance.    Eczema, likely asteatotic dermatitis- bilateral legs.  The potentially chronic and intermittently flaring nature of this condition and treatment options were discussed extensively with the patient today.  At this time, we recommend topical steroid therapy with Triamcinolone 0.025% cream, which the patient was instructed to apply twice daily to the affected areas of legs (avoid face, groin, body folds) for the next 2 weeks, followed by taper to twice daily on weekends only for persistent areas; the patient may repeat treatment in a 2-week burst-and-taper fashion every 6-8 weeks as needed for future flares.  We also emphasized the importance of dry, sensitive skin care, including the use of a mild soap, such as Dove, and frequent and aggressive moisturization, at least twice daily and immediately following showers or baths, with recommended over-the-counter moisturizing creams, such as Eucerin, Cetaphil, Cerave, or Aveeno, or Vaseline or Aquaphor ointments.  The risks, benefits, and side effects of this medication, including possible skin atrophy with overuse of topical steroids, were discussed.  The patient expressed understanding and is in agreement with this plan.    triamcinolone (Kenalog) 0.025 %  cream  Apply twice daily to affected areas of body (avoid face, groin, body folds) for 2 weeks, then taper to twice daily on weekends only; repeat every 6-8 weeks as needed for flares    2. Neoplasm of uncertain behavior of skin  mid upper forehead  1 cm dark brown pigmented to purplish, asymmetric, thin plaque with an asymmetric pigment network and irregular borders           Lesion biopsy  Type of biopsy: punch    Informed consent: discussed and consent obtained    Timeout: patient name, date of birth, surgical site, and procedure verified    Anesthesia: the lesion was anesthetized in a standard fashion    Anesthetic:  1% lidocaine w/ epinephrine 1-100,000 local infiltration  Punch size:  3 mm  Suture size:  5-0  Suture type: Prolene (polypropylene)    Suture removal (days):  7  Hemostasis achieved with: suture    Outcome: patient tolerated procedure well    Post-procedure details: sterile dressing applied and wound care instructions given    Dressing type: bandage and petrolatum      Specimen 1 - Dermatopathology- DERM LAB  Differential Diagnosis: SK v less likely keloid v less likely SCC  Check Margins Yes/No?:    Comments:    Dermpath Lab: Routine Histopathology (formalin-fixed tissue)    Lesion concerning for skin cancer -mid upper forehead.  The clinical differential diagnosis for this lesion includes possibly seborrheic keratosis versus squamous cell carcinoma in situ versus basal cell carcinoma versus nevus versus melanoma.  We recommend biopsy of this lesion for further diagnostic evaluation and to rule out a skin cancer.  After discussing the risks and benefits of various options for the biopsy, the patient expressed understanding wishes to undergo punch biopsy of this lesion in the office today for further diagnostic evaluation.    3. Melanocytic nevus, unspecified location  Scattered on the patient's face, neck, trunk, and extremities, there are several small, round- to oval-shaped, brown-pigmented and  pink-colored, symmetric, uniform-appearing macules and dome-shaped papules    Clinically benign- to slightly atypical-appearing nevi - the clinically benign- to slightly atypical-appearing nature of the patient's nevi was discussed with the patient today.  None of the patient's nevi meet threshold for biopsy today.   We emphasized the importance of performing monthly self-skin exams using the ABCDs of monitoring moles, which were reviewed with the patient today.  We also emphasized the importance of sun avoidance and sun protection with daily sunscreen use.  The patient expressed understanding and is in agreement with this plan.    4. Seborrheic keratosis  Scattered on the patient's face, neck, trunk, and extremities, there are multiple brown-colored, hyperkeratotic, stuck-on appearing papules of varying size and shape    Seborrheic Keratoses - the benign nature of these lesions was discussed with the patient today and reassurance provided.  No treatment is medically indicated for these lesions at this time.    5. Hemangioma of skin  Scattered on the patient's face, neck, trunk, and extremities, there are multiple small, round, cherry red- to purplish-colored, symmetric, uniform, vascular-appearing macules and papules    Cherry Angiomas - the benign nature of these vascular lesions was discussed with the patient today and reassurance provided.  No treatment is medically indicated for these lesions at this time.    6. Diffuse photodamage of skin  Photodistributed  Diffuse photodamage with actinic changes with telangiectasia and mottled pigmentation in sun-exposed areas.    Photodamage.  The signs and symptoms of skin cancer were reviewed and the patient was advised to practice sun protection and sun avoidance, use daily sunscreen, and perform regular self skin exams.  Sun protection was discussed, including avoiding the mid-day sun, wearing a sunscreen with SPF at least 50, and stressing the need for reapplication of  sunscreen and applying more than they think they need.        Seen and discussed with attending physician Dr. Ana Maria Cortes MD, PhD  Resident, Dermatology      I saw and evaluated the patient. I personally obtained the key and critical portions of the history and physical exam or was physically present for key and critical portions performed by the resident/fellow. I reviewed the resident/fellow's documentation and discussed the patient with the resident/fellow. I agree with the resident/fellow's medical decision making as documented in the note.    Pierre Rodgers MD

## 2024-09-13 LAB
LABORATORY COMMENT REPORT: NORMAL
PATH REPORT.FINAL DX SPEC: NORMAL
PATH REPORT.GROSS SPEC: NORMAL
PATH REPORT.RELEVANT HX SPEC: NORMAL
PATH REPORT.TOTAL CANCER: NORMAL

## 2024-09-17 ENCOUNTER — APPOINTMENT (OUTPATIENT)
Dept: DERMATOLOGY | Facility: CLINIC | Age: 69
End: 2024-09-17
Payer: MEDICARE

## 2024-09-17 DIAGNOSIS — L82.1 SEBORRHEIC KERATOSIS: ICD-10-CM

## 2024-09-17 DIAGNOSIS — L57.8 DIFFUSE PHOTODAMAGE OF SKIN: ICD-10-CM

## 2024-09-17 DIAGNOSIS — L82.0 BENIGN LICHENOID KERATOSIS: Primary | ICD-10-CM

## 2024-09-17 PROCEDURE — 99214 OFFICE O/P EST MOD 30 MIN: CPT | Performed by: DERMATOLOGY

## 2024-09-17 NOTE — PROGRESS NOTES
"Subjective     Ariel Howard Jr. is a 69 y.o. male who presents for the following: Follow-up.  He reports he had a lesion on his forehead evaluated by Dr. Gutierrez last month, and it was recommended he undergo biopsy of the lesion on his forehead, but he declined biopsy at that time and presented to our office on 9/11/24 for re-evaluation and consideration of possible biopsy.  He reports the lesion has been present for over 2 months and appeared suddenly and has become more red, raised, and slightly itchy.    Punch biopsy of the lesion on his mid upper forehead performed at his last visit in our office on 9/11/24 revealed a \"lichenoid tissue reaction,\" the findings of which could be seen in lichen planus or a lichenoid keratosis and less likely secondary syphilis.  He denies any new, changing, or concerning skin lesions since his last visit; no bleeding, itching, or burning lesions.      Review of Systems:  No other skin or systemic complaints other than what is documented elsewhere in the note.    The following portions of the chart were reviewed this encounter and updated as appropriate:       Skin Cancer History  No skin cancer on file.    Specialty Problems          Dermatology Problems    Eczema       Past Dermatologic / Past Relevant Medical History:    - history of asteatotic eczema  - no history of skin cancer or psoriasis    Family History:    No family history of melanoma or skin cancer    Social History:    The patient states he is retired from working as a  in [place]    Allergies:  Patient has no known allergies.    Current Medications / CAM's:    Current Outpatient Medications:     aspirin 81 mg EC tablet, TAKE 1 TABLET BY MOUTH EVERY DAY, Disp: 90 tablet, Rfl: 3    atorvastatin (Lipitor) 20 mg tablet, TAKE 1 TABLET BY MOUTH EVERY DAY, Disp: 90 tablet, Rfl: 3    lisinopriL-hydrochlorothiazide 20-25 mg tablet, TAKE 1 TABLET BY MOUTH EVERY DAY, Disp: 90 " tablet, Rfl: 3    travoprost (Travatan Z) 0.004 % drops ophthalmic solution, Administer 1 drop into both eyes once daily at bedtime., Disp: , Rfl:     triamcinolone (Kenalog) 0.025 % cream, Apply twice daily to affected areas of body (avoid face, groin, body folds) for 2 weeks, then taper to twice daily on weekends only; repeat every 6-8 weeks as needed for flares, Disp: 80 g, Rfl: 2     Objective   Well appearing patient in no apparent distress; mood and affect are within normal limits.    A skin examination was performed including: Face, neck, scalp, and bilateral upper extremities. All findings within normal limits unless otherwise noted below.    Assessment/Plan   1. Benign lichenoid keratosis  Head - Anterior (Face)  On his mid upper forehead, there is a 1 cm dark brown to purplish and erythematous, asymmetric, thin plaque with a central pink, well-healed scar at his recent biopsy site.  There are no similar-appearing lesions noted anywhere else on his skin on exam today.    Biopsy-proven lichenoid tissue reaction - mid upper forehead.  Based on the patient's history, recent exam and repeat exam today, and the histologic findings in his recent biopsy, which could be seen in lichen planus or a lichenoid keratosis or less likely secondary syphilis, the favored clinico-pathologic diagnosis for this lesion, especially given the solitary nature of the lesion without any similar lesions noted anywhere else on his skin, is most likely a lichenoid keratosis.    The benign nature of this lesion and management options were discussed extensively with the patient in the office today and reassurance provided.  No treatment is medically necessary for this lesion at this time.  However, given the symptomatic nature of the lesion, I discussed several treatment options, including topical therapy, destruction with liquid nitrogen cryotherapy, as well as excision both via horizontal shave technique and surgical excision.  After  discussing the risks and benefits of each of these options at length, the patient expressed understanding and wishes to attempt a course of topical therapy.    Thus, I recommend topical steroid therapy with Triamcinolone 0.025% cream, which the patient was instructed to apply twice daily to the affected areas of his upper forehead (avoid the eyes) for the next 2 weeks.  The risks, benefits, and side effects of this medication, including possible skin atrophy with overuse of topical steroids, were discussed.  Should develop any new or similar lesions anywhere else on his skin in the future, he was instructed to call our office to schedule a return visit for re-evaluation and further management considerations, including possible laboratory work-up with a Syphilis IgG test or RPR in order to rule out secondary syphilis, which was offered to the patient today, but he declined the laboratory test, given the solitary nature of the lesion and the favored diagnosis of a lichenoid keratosis, as well as additional treatment considerations, such as for possible LP, if indicated at that time.  The patient expressed understanding and is in agreement with this plan.  Of note, his suture was removed in the office today as well.    2. Seborrheic keratosis  Head - Anterior (Face)  Scattered on the patient's face, neck, and bilateral upper extremities, there are multiple brown-colored, hyperkeratotic, stuck-on appearing papules of varying size and shape    Seborrheic Keratoses - the benign nature of these lesions was discussed with the patient today and reassurance provided.  No treatment is medically indicated for these lesions at this time.    3. Diffuse photodamage of skin  Photodistributed  Diffuse photodamage with actinic changes with telangiectasia and mottled pigmentation in sun-exposed areas.    Photodamage.  The signs and symptoms of skin cancer were reviewed and the patient was advised to practice sun protection and sun  avoidance, use daily sunscreen, and perform regular self skin exams.  Sun protection was discussed, including avoiding the mid-day sun, wearing a sunscreen with SPF at least 50, and stressing the need for reapplication of sunscreen and applying more than they think they need.

## 2024-09-18 ENCOUNTER — APPOINTMENT (OUTPATIENT)
Dept: NEUROLOGY | Facility: HOSPITAL | Age: 69
End: 2024-09-18
Payer: MEDICARE

## 2024-09-18 ENCOUNTER — HOSPITAL ENCOUNTER (OUTPATIENT)
Dept: NEUROLOGY | Facility: HOSPITAL | Age: 69
Discharge: HOME | End: 2024-09-18
Payer: MEDICARE

## 2024-09-18 DIAGNOSIS — R55 SYNCOPE AND COLLAPSE: ICD-10-CM

## 2024-09-21 DIAGNOSIS — I10 ESSENTIAL (PRIMARY) HYPERTENSION: ICD-10-CM

## 2024-09-25 RX ORDER — LISINOPRIL AND HYDROCHLOROTHIAZIDE 20; 25 MG/1; MG/1
1 TABLET ORAL DAILY
Qty: 90 TABLET | Refills: 2 | Status: SHIPPED | OUTPATIENT
Start: 2024-09-25

## 2024-09-26 ENCOUNTER — APPOINTMENT (OUTPATIENT)
Dept: NEUROLOGY | Facility: CLINIC | Age: 69
End: 2024-09-26
Payer: MEDICARE

## 2024-10-08 ENCOUNTER — OFFICE VISIT (OUTPATIENT)
Dept: CARDIOLOGY | Facility: CLINIC | Age: 69
End: 2024-10-08
Payer: MEDICARE

## 2024-10-08 VITALS
OXYGEN SATURATION: 98 % | HEIGHT: 72 IN | WEIGHT: 211.2 LBS | DIASTOLIC BLOOD PRESSURE: 84 MMHG | HEART RATE: 74 BPM | SYSTOLIC BLOOD PRESSURE: 128 MMHG | BODY MASS INDEX: 28.61 KG/M2

## 2024-10-08 DIAGNOSIS — R55 SYNCOPE, UNSPECIFIED SYNCOPE TYPE: ICD-10-CM

## 2024-10-08 PROCEDURE — 1159F MED LIST DOCD IN RCRD: CPT | Performed by: INTERNAL MEDICINE

## 2024-10-08 PROCEDURE — 1036F TOBACCO NON-USER: CPT | Performed by: INTERNAL MEDICINE

## 2024-10-08 PROCEDURE — 93005 ELECTROCARDIOGRAM TRACING: CPT | Performed by: INTERNAL MEDICINE

## 2024-10-08 PROCEDURE — 99213 OFFICE O/P EST LOW 20 MIN: CPT | Performed by: INTERNAL MEDICINE

## 2024-10-08 PROCEDURE — 93010 ELECTROCARDIOGRAM REPORT: CPT | Performed by: INTERNAL MEDICINE

## 2024-10-08 PROCEDURE — 3008F BODY MASS INDEX DOCD: CPT | Performed by: INTERNAL MEDICINE

## 2024-10-08 PROCEDURE — 3074F SYST BP LT 130 MM HG: CPT | Performed by: INTERNAL MEDICINE

## 2024-10-08 PROCEDURE — 3079F DIAST BP 80-89 MM HG: CPT | Performed by: INTERNAL MEDICINE

## 2024-10-08 NOTE — PROGRESS NOTES
Referred by Bernard Travis MD provider found for   Chief Complaint   Patient presents with    Syncope        Ariel Howard Jr. is a 69 y.o. year old male patient with h/o HTN, HLD, vit D deficiency, and vasovagal syncope. Underwent tilt table test and presents to go over the study results.     PMHx/PSHx: As above    FamHx: unremarkable     Allergies:  No Known Allergies     Review of Systems    Constitutional: not feeling tired.   Eyes: no eyesight problems.   ENT: no hearing loss and no nosebleeds.   Cardiovascular: no intermittent leg claudication and as noted in HPI.   Respiratory: no chronic cough and no shortness of breath.   Gastrointestinal: no change in bowel habits and no blood in stools.   Genitourinary: no urinary frequency and no hematuria.   Skin: no skin rashes.   Neurological: no seizures and no frequent falls.   Psychiatric: no depression and not suicidal.   All other systems have been reviewed and are negative for complaint.     Outpatient Medications:  Current Outpatient Medications   Medication Instructions    aspirin 81 mg, oral, Daily    atorvastatin (LIPITOR) 20 mg, oral, Daily    lisinopriL-hydrochlorothiazide 20-25 mg tablet 1 tablet, oral, Daily    travoprost (Travatan Z) 0.004 % drops ophthalmic solution 1 drop, Both Eyes, Nightly    triamcinolone (Kenalog) 0.025 % cream Apply twice daily to affected areas of body (avoid face, groin, body folds) for 2 weeks, then taper to twice daily on weekends only; repeat every 6-8 weeks as needed for flares         Last Recorded Vitals:      5/28/2024    10:33 AM 5/28/2024    10:34 AM 5/28/2024    10:35 AM 5/28/2024     4:19 PM 6/10/2024    10:26 AM 7/9/2024     3:41 PM 7/9/2024     3:45 PM   Vitals   Systolic 113 136 142 131 132 139 132   Diastolic 83 84 88 85 84 81 81   Heart Rate 86 82 86 68 91 83    Temp 36.4 °C (97.5 °F)   36.1 °C (97 °F) 36.4 °C (97.6 °F)     Resp 17 17 18 17 18     Height (in)     1.829 m (6') 1.829 m (6')    Weight  (lb)     215.3 213.9    BMI     29.2 kg/m2 29.01 kg/m2    BSA (m2)     2.23 m2 2.22 m2    Visit Report     Report Report Report    Visit Vitals  Smoking Status Never        Physical Exam:  Constitutional: alert and in no acute distress.   Eyes: no erythema, swelling or discharge from the eye .   Neck: neck is supple, symmetric, trachea midline, no masses  and no thyromegaly .   Pulmonary: no increased work of breathing or signs of respiratory distress  and lungs clear to auscultation.    Cardiovascular: carotid pulses 2+ bilaterally with no bruit , JVP was normal, no thrills , regular rhythm, normal S1 and S2, no murmurs , pedal pulses 2+ bilaterally  and no edema .   Abdomen: abdomen non-tender, no masses  and no hepatomegaly .   Skin: skin warm and dry, normal skin turgor .   Psychiatric judgment and insight is normal  and oriented to person, place and time .        Assessment/Plan   Problem List Items Addressed This Visit             ICD-10-CM    Syncope, unspecified syncope type R55    Relevant Orders    ECG 12 Lead       Ariel Howard Jr. is a 69 y.o. year old male patient with h/o HTN, HLD, vit D deficiency, and vasovagal syncope. Underwent tilt table test and presents to go over the study results.   The tilt table test was negative for vasovagal or neurocardiogenic syncope. His current ECG shows NSR with narrow QRS and HR of 74 bpm. He reports no further episodes of syncope or presyncope. At this point this was most likely a vagal episode and there is no need for further evaluation. The patient was instructed ot  contact my office of the episodes come back.         Bernard Hightower MD  Cardiac Electrophysiology      Thank you very much for allowing me to participate in the care of this pleasant patient. Please do not hesitate to contact me with any further questions or concerns regarding his care.    **Disclaimer: This note was dictated by speech recognition, and every effort has been made to prevent any  error in transcription, however minor errors may be present**

## 2024-10-09 LAB
ATRIAL RATE: 74 BPM
P AXIS: 58 DEGREES
P OFFSET: 206 MS
P ONSET: 147 MS
PR INTERVAL: 164 MS
Q ONSET: 229 MS
QRS COUNT: 13 BEATS
QRS DURATION: 78 MS
QT INTERVAL: 364 MS
QTC CALCULATION(BAZETT): 404 MS
QTC FREDERICIA: 390 MS
R AXIS: 10 DEGREES
T AXIS: 21 DEGREES
T OFFSET: 411 MS
VENTRICULAR RATE: 74 BPM

## 2024-10-17 ENCOUNTER — LAB (OUTPATIENT)
Dept: LAB | Facility: LAB | Age: 69
End: 2024-10-17

## 2024-10-17 DIAGNOSIS — R97.20 ELEVATED PSA: ICD-10-CM

## 2024-10-17 PROCEDURE — 36415 COLL VENOUS BLD VENIPUNCTURE: CPT

## 2024-10-17 PROCEDURE — 84154 ASSAY OF PSA FREE: CPT

## 2024-10-17 PROCEDURE — 84153 ASSAY OF PSA TOTAL: CPT

## 2024-10-19 LAB
PSA FREE MFR SERPL: 33 %
PSA FREE SERPL-MCNC: 0.9 NG/ML
PSA SERPL IA-MCNC: 2.7 NG/ML (ref 0–4)

## 2024-10-23 NOTE — PROGRESS NOTES
FUV    Last visit - 4/19/24     HISTORY OF PRESENT ILLNESS:   Ariel Howard Jr. is a 69 y.o. male who is being seen today for 6 month FUV  - Elevated PSA  - MRI on 8/5/2022 demonstrated an intermediate PI-RADS 3 lesion    PSA trend:  -2.7 with 33% on 10/17/24  -2.7 with 52% on 4/12/24  -1.81 on 10/12/23  - 2.2 total with 36% free on 4/5/23  - 2.0 total with 40% free on 9/6/22    PAST MEDICAL HISTORY:  Past Medical History:   Diagnosis Date    Abnormal weight gain 02/21/2014    Weight gain    Acute upper respiratory infection, unspecified 12/07/2013    Acute upper respiratory infection    Body mass index (BMI) 29.0-29.9, adult 05/03/2021    BMI 29.0-29.9,adult    Encounter for general adult medical examination without abnormal findings 01/04/2014    Encounter for general health examination    Encounter for immunization 09/28/2020    Need for prophylactic vaccination and inoculation against influenza    Encounter for immunization 10/25/2013    Need for prophylactic vaccination with measles-mumps-rubella (MMR) vaccine    Encounter for screening for malignant neoplasm of prostate 01/04/2014    Encounter for screening for malignant neoplasm of prostate    Encounter for screening for other viral diseases 10/07/2013    Screening for viral disease    Hyperlipidemia     Hypertension     Impacted cerumen, bilateral 12/03/2016    Excessive cerumen in both ear canals    Impacted cerumen, left ear 07/03/2014    Impacted cerumen of left ear    Other conditions influencing health status 06/21/2013    Screening for malignant neoplasms, colon       PAST SURGICAL HISTORY:  Past Surgical History:   Procedure Laterality Date    COLONOSCOPY      WISDOM TOOTH EXTRACTION          ALLERGIES:   No Known Allergies     MEDICATIONS:   Current Outpatient Medications   Medication Instructions    aspirin 81 mg, oral, Daily    atorvastatin (LIPITOR) 20 mg, oral, Daily    lisinopriL-hydrochlorothiazide 20-25 mg tablet 1 tablet, oral, Daily     "travoprost (Travatan Z) 0.004 % drops ophthalmic solution 1 drop, Both Eyes, Nightly    triamcinolone (Kenalog) 0.025 % cream Apply twice daily to affected areas of body (avoid face, groin, body folds) for 2 weeks, then taper to twice daily on weekends only; repeat every 6-8 weeks as needed for flares        PHYSICAL EXAM:  There were no vitals taken for this visit.  Constitutional: Patient appears well-developed and well-nourished. No distress.    Pulmonary/Chest: Effort normal. No respiratory distress.   Abdominal: Soft, ND NT  : WNL  Musculoskeletal: Normal range of motion.    Neurological: Alert and oriented to person, place, and time.  Psychiatric: Normal mood and affect. Behavior is normal. Thought content normal.      Labs:  No results found for: \"TESTOSTERONE\"  Lab Results   Component Value Date    PSA 2.7 10/17/2024     No components found for: \"CBC\"  Lab Results   Component Value Date    CREATININE 1.26 05/28/2024     No components found for: \"TESTOTMS\"  No results found for: \"TESTF\"    Imaging:    Discussion: Results reviewed with patient. Patient reassured. Patient doing well. No new urinary complaints. Follow up in 6 months with PSA panel prior.         Assessment:    No diagnosis found.    Ariel Howard Jr. is a 69 y.o. male here for FUV     Plan:   1) Follow up in 6 months with PSA panel prior.   All questions and concerns were addressed. Patient verbalizes understanding and has no other questions at this time.     Scribe Attestation  By signing my name below, IAura Scribe   attest that this documentation has been prepared under the direction and in the presence of Oumar Martinez MD.    "

## 2024-10-24 ENCOUNTER — OFFICE VISIT (OUTPATIENT)
Dept: UROLOGY | Facility: HOSPITAL | Age: 69
End: 2024-10-24
Payer: MEDICARE

## 2024-10-24 DIAGNOSIS — R97.20 ELEVATED PSA: ICD-10-CM

## 2024-10-24 PROCEDURE — G2211 COMPLEX E/M VISIT ADD ON: HCPCS | Performed by: UROLOGY

## 2024-10-24 PROCEDURE — 99213 OFFICE O/P EST LOW 20 MIN: CPT | Performed by: UROLOGY

## 2024-10-24 NOTE — PROGRESS NOTES
FUV    Last visit - 4/19/24     HISTORY OF PRESENT ILLNESS:   Ariel Howard Jr. is a 69 y.o. male who is being seen today for 6 month FUV  - Elevated PSA  - MRI on 8/5/2022 demonstrated an intermediate PI-RADS 3 lesion    PSA trend:  -2.7 with 33% on 10/17/24  -2.7 with 52% on 4/12/24  -1.81 on 10/12/23  - 2.2 total with 36% free on 4/5/23  - 2.0 total with 40% free on 9/6/22    PAST MEDICAL HISTORY:  Past Medical History:   Diagnosis Date    Abnormal weight gain 02/21/2014    Weight gain    Acute upper respiratory infection, unspecified 12/07/2013    Acute upper respiratory infection    Body mass index (BMI) 29.0-29.9, adult 05/03/2021    BMI 29.0-29.9,adult    Encounter for general adult medical examination without abnormal findings 01/04/2014    Encounter for general health examination    Encounter for immunization 09/28/2020    Need for prophylactic vaccination and inoculation against influenza    Encounter for immunization 10/25/2013    Need for prophylactic vaccination with measles-mumps-rubella (MMR) vaccine    Encounter for screening for malignant neoplasm of prostate 01/04/2014    Encounter for screening for malignant neoplasm of prostate    Encounter for screening for other viral diseases 10/07/2013    Screening for viral disease    Hyperlipidemia     Hypertension     Impacted cerumen, bilateral 12/03/2016    Excessive cerumen in both ear canals    Impacted cerumen, left ear 07/03/2014    Impacted cerumen of left ear    Other conditions influencing health status 06/21/2013    Screening for malignant neoplasms, colon       PAST SURGICAL HISTORY:  Past Surgical History:   Procedure Laterality Date    COLONOSCOPY      WISDOM TOOTH EXTRACTION          ALLERGIES:   No Known Allergies     MEDICATIONS:   Current Outpatient Medications   Medication Instructions    aspirin 81 mg, oral, Daily    atorvastatin (LIPITOR) 20 mg, oral, Daily    lisinopriL-hydrochlorothiazide 20-25 mg tablet 1 tablet, oral, Daily     "travoprost (Travatan Z) 0.004 % drops ophthalmic solution 1 drop, Both Eyes, Nightly    triamcinolone (Kenalog) 0.025 % cream Apply twice daily to affected areas of body (avoid face, groin, body folds) for 2 weeks, then taper to twice daily on weekends only; repeat every 6-8 weeks as needed for flares        PHYSICAL EXAM:  There were no vitals taken for this visit.  Constitutional: Patient appears well-developed and well-nourished. No distress.    Pulmonary/Chest: Effort normal. No respiratory distress.   Abdominal: Soft, ND NT  : WNL  Musculoskeletal: Normal range of motion.    Neurological: Alert and oriented to person, place, and time.  Psychiatric: Normal mood and affect. Behavior is normal. Thought content normal.    Rectal exam: he had enlarged asymmetrical prostrate, larger on the right then on the left.     Labs:  No results found for: \"TESTOSTERONE\"  Lab Results   Component Value Date    PSA 2.7 10/17/2024     No components found for: \"CBC\"  Lab Results   Component Value Date    CREATININE 1.26 05/28/2024     No components found for: \"TESTOTMS\"  No results found for: \"TESTF\"    Discussion: Results reviewed with patient. Patient reassured. Patient doing well. No new urinary complaints. He is emptying well. Stable erections. PSA is low. Rectal exam is show asymmetrical prostate, no appreciable nodules. Will continue to monitor. Follow up in 6 months with PSA panel prior.       Assessment:    No diagnosis found.    Ariel Howard Jr. is a 69 y.o. male here for FUV     Plan:   1) Follow up in 6 months with PSA panel prior.   All questions and concerns were addressed. Patient verbalizes understanding and has no other questions at this time.     Scribe Attestation  By signing my name below, IMaryanne Scribe   attest that this documentation has been prepared under the direction and in the presence of Oumar Martinez MD.    "

## 2024-10-31 ENCOUNTER — APPOINTMENT (OUTPATIENT)
Dept: PRIMARY CARE | Facility: CLINIC | Age: 69
End: 2024-10-31
Payer: MEDICARE

## 2024-11-11 ENCOUNTER — APPOINTMENT (OUTPATIENT)
Dept: PRIMARY CARE | Facility: HOSPITAL | Age: 69
End: 2024-11-11
Payer: MEDICARE

## 2024-11-18 ENCOUNTER — LAB (OUTPATIENT)
Dept: LAB | Facility: LAB | Age: 69
End: 2024-11-18
Payer: MEDICARE

## 2024-11-18 ENCOUNTER — OFFICE VISIT (OUTPATIENT)
Dept: PRIMARY CARE | Facility: HOSPITAL | Age: 69
End: 2024-11-18
Payer: MEDICARE

## 2024-11-18 VITALS
HEIGHT: 72 IN | WEIGHT: 212 LBS | TEMPERATURE: 98 F | HEART RATE: 88 BPM | RESPIRATION RATE: 18 BRPM | BODY MASS INDEX: 28.71 KG/M2 | SYSTOLIC BLOOD PRESSURE: 122 MMHG | DIASTOLIC BLOOD PRESSURE: 75 MMHG

## 2024-11-18 DIAGNOSIS — E78.2 HYPERLIPIDEMIA, MIXED: ICD-10-CM

## 2024-11-18 DIAGNOSIS — Z00.00 HEALTHCARE MAINTENANCE: ICD-10-CM

## 2024-11-18 DIAGNOSIS — I10 HYPERTENSION, UNSPECIFIED TYPE: ICD-10-CM

## 2024-11-18 DIAGNOSIS — Z00.00 HEALTHCARE MAINTENANCE: Primary | ICD-10-CM

## 2024-11-18 LAB
ALBUMIN SERPL BCP-MCNC: 4 G/DL (ref 3.4–5)
ALP SERPL-CCNC: 54 U/L (ref 33–136)
ALT SERPL W P-5'-P-CCNC: 16 U/L (ref 10–52)
ANION GAP SERPL CALC-SCNC: 12 MMOL/L (ref 10–20)
AST SERPL W P-5'-P-CCNC: 21 U/L (ref 9–39)
BILIRUB SERPL-MCNC: 0.5 MG/DL (ref 0–1.2)
BUN SERPL-MCNC: 18 MG/DL (ref 6–23)
CALCIUM SERPL-MCNC: 8.7 MG/DL (ref 8.6–10.3)
CHLORIDE SERPL-SCNC: 104 MMOL/L (ref 98–107)
CO2 SERPL-SCNC: 29 MMOL/L (ref 21–32)
CREAT SERPL-MCNC: 1.38 MG/DL (ref 0.5–1.3)
EGFRCR SERPLBLD CKD-EPI 2021: 56 ML/MIN/1.73M*2
GLUCOSE SERPL-MCNC: 88 MG/DL (ref 74–99)
POTASSIUM SERPL-SCNC: 3.5 MMOL/L (ref 3.5–5.3)
PROT SERPL-MCNC: 6.6 G/DL (ref 6.4–8.2)
SODIUM SERPL-SCNC: 141 MMOL/L (ref 136–145)

## 2024-11-18 PROCEDURE — 1160F RVW MEDS BY RX/DR IN RCRD: CPT | Performed by: INTERNAL MEDICINE

## 2024-11-18 PROCEDURE — 99215 OFFICE O/P EST HI 40 MIN: CPT | Performed by: INTERNAL MEDICINE

## 2024-11-18 PROCEDURE — 83036 HEMOGLOBIN GLYCOSYLATED A1C: CPT

## 2024-11-18 PROCEDURE — 3074F SYST BP LT 130 MM HG: CPT | Performed by: INTERNAL MEDICINE

## 2024-11-18 PROCEDURE — 3008F BODY MASS INDEX DOCD: CPT | Performed by: INTERNAL MEDICINE

## 2024-11-18 PROCEDURE — 36415 COLL VENOUS BLD VENIPUNCTURE: CPT

## 2024-11-18 PROCEDURE — 3078F DIAST BP <80 MM HG: CPT | Performed by: INTERNAL MEDICINE

## 2024-11-18 PROCEDURE — G0439 PPPS, SUBSEQ VISIT: HCPCS | Performed by: INTERNAL MEDICINE

## 2024-11-18 PROCEDURE — 1159F MED LIST DOCD IN RCRD: CPT | Performed by: INTERNAL MEDICINE

## 2024-11-18 PROCEDURE — 99214 OFFICE O/P EST MOD 30 MIN: CPT | Performed by: INTERNAL MEDICINE

## 2024-11-18 PROCEDURE — 1036F TOBACCO NON-USER: CPT | Performed by: INTERNAL MEDICINE

## 2024-11-18 PROCEDURE — 1123F ACP DISCUSS/DSCN MKR DOCD: CPT | Performed by: INTERNAL MEDICINE

## 2024-11-18 PROCEDURE — 1170F FXNL STATUS ASSESSED: CPT | Performed by: INTERNAL MEDICINE

## 2024-11-18 PROCEDURE — 1158F ADVNC CARE PLAN TLK DOCD: CPT | Performed by: INTERNAL MEDICINE

## 2024-11-18 PROCEDURE — 80053 COMPREHEN METABOLIC PANEL: CPT

## 2024-11-18 ASSESSMENT — ACTIVITIES OF DAILY LIVING (ADL)
GROCERY_SHOPPING: INDEPENDENT
MANAGING_FINANCES: INDEPENDENT
BATHING: INDEPENDENT
TAKING_MEDICATION: INDEPENDENT
DRESSING: INDEPENDENT
DOING_HOUSEWORK: INDEPENDENT

## 2024-11-18 ASSESSMENT — ENCOUNTER SYMPTOMS
AGITATION: 0
ACTIVITY CHANGE: 0
CHEST TIGHTNESS: 0
WEAKNESS: 0
SHORTNESS OF BREATH: 0
OCCASIONAL FEELINGS OF UNSTEADINESS: 0
DIARRHEA: 0
LOSS OF SENSATION IN FEET: 0
NAUSEA: 0
MYALGIAS: 0
PALPITATIONS: 0
CHILLS: 0
DEPRESSION: 0

## 2024-11-18 NOTE — PROGRESS NOTES
Subjective   Reason for Visit: Ariel Howard Jr. is an 69 y.o. male here for a Medicare Wellness visit.  The patient is doing well overall and is looking ways.  He has great compliance with his medications and reports no side effects.  He is requesting a repeat in his hemoglobin A1c.    Past Medical, Surgical, and Family History reviewed and updated in chart.    Reviewed all medications by prescribing practitioner or clinical pharmacist (such as prescriptions, OTCs, herbal therapies and supplements) and documented in the medical record.    HPI    Patient Care Team:  Harjinder Keller MD as PCP - General (Internal Medicine)  Harjinder Keller MD as PCP - [employer] PCP     Review of Systems   Constitutional:  Negative for activity change and chills.   HENT:  Negative for congestion.    Respiratory:  Negative for chest tightness and shortness of breath.    Cardiovascular:  Negative for chest pain and palpitations.   Gastrointestinal:  Negative for diarrhea and nausea.   Musculoskeletal:  Negative for myalgias.   Neurological:  Negative for weakness.   Psychiatric/Behavioral:  Negative for agitation and behavioral problems.        Objective   Vitals:  /75 (BP Location: Right arm, Patient Position: Sitting, BP Cuff Size: Adult)   Pulse 88   Temp 36.7 °C (98 °F)   Resp 18       Physical Exam  Constitutional:       Appearance: Normal appearance.   HENT:      Head: Normocephalic and atraumatic.      Nose: Nose normal.      Mouth/Throat:      Mouth: Mucous membranes are moist.   Eyes:      Extraocular Movements: Extraocular movements intact.      Pupils: Pupils are equal, round, and reactive to light.   Cardiovascular:      Rate and Rhythm: Normal rate and regular rhythm.   Pulmonary:      Effort: No respiratory distress.      Breath sounds: No wheezing.   Abdominal:      General: Bowel sounds are normal.      Palpations: Abdomen is soft.   Neurological:      General: No focal deficit present.          Assessment & Plan  Healthcare maintenance  Doing well overall.   Orders:    Hemoglobin A1C; Future    Comprehensive Metabolic Panel; Future    Hyperlipidemia, mixed  Good adherence       Hypertension, unspecified type  The patient has excellent control.

## 2024-11-19 LAB
EST. AVERAGE GLUCOSE BLD GHB EST-MCNC: 120 MG/DL
HBA1C MFR BLD: 5.8 %

## 2024-12-11 ENCOUNTER — APPOINTMENT (OUTPATIENT)
Dept: DERMATOLOGY | Facility: CLINIC | Age: 69
End: 2024-12-11
Payer: MEDICARE

## 2025-04-01 LAB
PSA FREE MFR SERPL: ABNORMAL % (CALC)
PSA FREE SERPL-MCNC: 2.8 NG/ML
PSA SERPL-MCNC: 17.4 NG/ML

## 2025-04-04 ENCOUNTER — OFFICE VISIT (OUTPATIENT)
Dept: UROLOGY | Facility: HOSPITAL | Age: 70
End: 2025-04-04
Payer: MEDICARE

## 2025-04-04 DIAGNOSIS — R97.20 ELEVATED PSA: ICD-10-CM

## 2025-04-04 PROCEDURE — 99214 OFFICE O/P EST MOD 30 MIN: CPT | Performed by: UROLOGY

## 2025-04-04 PROCEDURE — 1036F TOBACCO NON-USER: CPT | Performed by: UROLOGY

## 2025-04-04 PROCEDURE — 1123F ACP DISCUSS/DSCN MKR DOCD: CPT | Performed by: UROLOGY

## 2025-04-04 PROCEDURE — G2211 COMPLEX E/M VISIT ADD ON: HCPCS | Performed by: UROLOGY

## 2025-04-04 PROCEDURE — 1159F MED LIST DOCD IN RCRD: CPT | Performed by: UROLOGY

## 2025-04-04 ASSESSMENT — PATIENT HEALTH QUESTIONNAIRE - PHQ9
2. FEELING DOWN, DEPRESSED OR HOPELESS: NOT AT ALL
SUM OF ALL RESPONSES TO PHQ9 QUESTIONS 1 AND 2: 0
1. LITTLE INTEREST OR PLEASURE IN DOING THINGS: NOT AT ALL

## 2025-04-04 NOTE — PROGRESS NOTES
FUV    Last visit - 10/24/24     HISTORY OF PRESENT ILLNESS:   Ariel Howard Jr. is a 69 y.o. male who is being seen today for 6 month FUV with PSA results  - Elevated PSA  - MRI on 8/5/2022 demonstrated an intermediate PI-RADS 3 lesion    PSA trend:  -17.4 on 3/31/25  -2.7 with 33% on 10/17/24  -2.7 with 52% on 4/12/24  -1.81 on 10/12/23  - 2.2 total with 36% free on 4/5/23  - 2.0 total with 40% free on 9/6/22    PAST MEDICAL HISTORY:  Past Medical History:   Diagnosis Date    Abnormal weight gain 02/21/2014    Weight gain    Acute upper respiratory infection, unspecified 12/07/2013    Acute upper respiratory infection    Body mass index (BMI) 29.0-29.9, adult 05/03/2021    BMI 29.0-29.9,adult    Encounter for general adult medical examination without abnormal findings 01/04/2014    Encounter for general health examination    Encounter for immunization 09/28/2020    Need for prophylactic vaccination and inoculation against influenza    Encounter for immunization 10/25/2013    Need for prophylactic vaccination with measles-mumps-rubella (MMR) vaccine    Encounter for screening for malignant neoplasm of prostate 01/04/2014    Encounter for screening for malignant neoplasm of prostate    Encounter for screening for other viral diseases 10/07/2013    Screening for viral disease    Hyperlipidemia     Hypertension     Impacted cerumen, bilateral 12/03/2016    Excessive cerumen in both ear canals    Impacted cerumen, left ear 07/03/2014    Impacted cerumen of left ear    Other conditions influencing health status 06/21/2013    Screening for malignant neoplasms, colon       PAST SURGICAL HISTORY:  Past Surgical History:   Procedure Laterality Date    COLONOSCOPY      WISDOM TOOTH EXTRACTION          ALLERGIES:   No Known Allergies     MEDICATIONS:   Current Outpatient Medications   Medication Instructions    aspirin 81 mg, oral, Daily    atorvastatin (LIPITOR) 20 mg, oral, Daily    lisinopriL-hydrochlorothiazide 20-25  "mg tablet 1 tablet, oral, Daily    travoprost (Travatan Z) 0.004 % drops ophthalmic solution 1 drop, Both Eyes, Nightly    triamcinolone (Kenalog) 0.025 % cream Apply twice daily to affected areas of body (avoid face, groin, body folds) for 2 weeks, then taper to twice daily on weekends only; repeat every 6-8 weeks as needed for flares        PHYSICAL EXAM:  There were no vitals taken for this visit.  Constitutional: Patient appears well-developed and well-nourished. No distress.    Pulmonary/Chest: Effort normal. No respiratory distress.   Abdominal: Soft, ND NT  : WNL  Musculoskeletal: Normal range of motion.    Neurological: Alert and oriented to person, place, and time.  Psychiatric: Normal mood and affect. Behavior is normal. Thought content normal.    Rectal exam: he had enlarged asymmetrical prostrate, larger on the right then on the left.     Labs:  No results found for: \"TESTOSTERONE\"  Lab Results   Component Value Date    PSA 17.4 (H) 03/31/2025     Lab Results   Component Value Date    PSA 17.4 (H) 03/31/2025    PSA 2.7 10/17/2024    PSA 2.7 04/12/2024    PSA 1.81 10/12/2023    PSA 2.2 04/05/2023    PSA 2.0 09/06/2022    PSA 1.57 03/07/2020       No components found for: \"CBC\"  Lab Results   Component Value Date    CREATININE 1.38 (H) 11/18/2024     No components found for: \"TESTOTMS\"  No results found for: \"TESTF\"  AUA:3  MORGAN: 3 (erections not a priority)   PVR: 0    Discussion: PSA Results reviewed with patient. Patient reassured. Patient doing well. No new urinary complaints.Denies hematuria, dysuria, nocturia, flank pain, and bothersome frequency or urgency. He is emptying well. Stable erections. PSA jumped to 17, reassured this is likely not due to cancer and we can continue to monitor his trend. Will get a PSA in 3 and 6 months with a fu in 6 mths to discuss the trend.     Assessment:      1. Elevated PSA  Measure post void residual    PSA, Total and Free    PSA, Total and Free          Airel " Lee Boggs is a 69 y.o. male here for FUV     Plan:   1) Will get a PSA in 3 and 6 months with a fu in 6 mths to discuss the trend.   All questions and concerns were addressed. Patient verbalizes understanding and has no other questions at this time.     Scribe Attestation  By signing my name below, IAura , Scribe   attest that this documentation has been prepared under the direction and in the presence of Oumar Martinez MD.

## 2025-04-07 ENCOUNTER — OFFICE VISIT (OUTPATIENT)
Dept: PRIMARY CARE | Facility: HOSPITAL | Age: 70
End: 2025-04-07
Payer: MEDICARE

## 2025-04-07 VITALS
DIASTOLIC BLOOD PRESSURE: 76 MMHG | OXYGEN SATURATION: 100 % | SYSTOLIC BLOOD PRESSURE: 132 MMHG | HEIGHT: 72 IN | BODY MASS INDEX: 28.85 KG/M2 | WEIGHT: 213 LBS | HEART RATE: 83 BPM | TEMPERATURE: 96.8 F

## 2025-04-07 DIAGNOSIS — R79.89 ELEVATED SERUM CREATININE: ICD-10-CM

## 2025-04-07 DIAGNOSIS — R79.9 ABNORMAL FINDING OF BLOOD CHEMISTRY, UNSPECIFIED: ICD-10-CM

## 2025-04-07 DIAGNOSIS — M70.22 OLECRANON BURSITIS OF LEFT ELBOW: Primary | ICD-10-CM

## 2025-04-07 DIAGNOSIS — D56.1 BETA THALASSEMIA (MULTI): ICD-10-CM

## 2025-04-07 DIAGNOSIS — E55.9 HYPOVITAMINOSIS D: ICD-10-CM

## 2025-04-07 DIAGNOSIS — I44.2 ATRIOVENTRICULAR BLOCK, COMPLETE (MULTI): ICD-10-CM

## 2025-04-07 LAB
PSA FREE MFR SERPL: ABNORMAL % (CALC)
PSA FREE SERPL-MCNC: 3.2 NG/ML
PSA SERPL-MCNC: 15.9 NG/ML

## 2025-04-07 PROCEDURE — 3075F SYST BP GE 130 - 139MM HG: CPT | Performed by: INTERNAL MEDICINE

## 2025-04-07 PROCEDURE — 3008F BODY MASS INDEX DOCD: CPT | Performed by: INTERNAL MEDICINE

## 2025-04-07 PROCEDURE — 3078F DIAST BP <80 MM HG: CPT | Performed by: INTERNAL MEDICINE

## 2025-04-07 PROCEDURE — 1126F AMNT PAIN NOTED NONE PRSNT: CPT | Performed by: INTERNAL MEDICINE

## 2025-04-07 PROCEDURE — 99214 OFFICE O/P EST MOD 30 MIN: CPT | Performed by: INTERNAL MEDICINE

## 2025-04-07 PROCEDURE — 1159F MED LIST DOCD IN RCRD: CPT | Performed by: INTERNAL MEDICINE

## 2025-04-07 PROCEDURE — 1036F TOBACCO NON-USER: CPT | Performed by: INTERNAL MEDICINE

## 2025-04-07 PROCEDURE — 1123F ACP DISCUSS/DSCN MKR DOCD: CPT | Performed by: INTERNAL MEDICINE

## 2025-04-07 PROCEDURE — G2211 COMPLEX E/M VISIT ADD ON: HCPCS | Performed by: INTERNAL MEDICINE

## 2025-04-07 PROCEDURE — 1160F RVW MEDS BY RX/DR IN RCRD: CPT | Performed by: INTERNAL MEDICINE

## 2025-04-07 ASSESSMENT — PATIENT HEALTH QUESTIONNAIRE - PHQ9
SUM OF ALL RESPONSES TO PHQ9 QUESTIONS 1 AND 2: 0
2. FEELING DOWN, DEPRESSED OR HOPELESS: NOT AT ALL
1. LITTLE INTEREST OR PLEASURE IN DOING THINGS: NOT AT ALL

## 2025-04-07 ASSESSMENT — ENCOUNTER SYMPTOMS
CHILLS: 0
SORE THROAT: 0
SHORTNESS OF BREATH: 0
CHEST TIGHTNESS: 0
WEAKNESS: 0
MYALGIAS: 0
PALPITATIONS: 0
SINUS PRESSURE: 0
NAUSEA: 0
DEPRESSION: 0
DIARRHEA: 0
AGITATION: 0
ACTIVITY CHANGE: 0

## 2025-04-07 ASSESSMENT — PAIN SCALES - GENERAL: PAINLEVEL_OUTOF10: 0-NO PAIN

## 2025-04-07 NOTE — PROGRESS NOTES
Subjective   Patient ID: Ariel Howard Jr. is a 69 y.o. male who presents for Sick Visit (Left elbow swelling x 4 weeks).    Arm Injury   The incident occurred more than 1 week ago. The injury mechanism was a direct blow. The pain is present in the left elbow. The pain does not radiate. The patient is experiencing no pain. The pain has been Intermittent since the incident. Pertinent negatives include no chest pain.        Review of Systems   Constitutional:  Negative for activity change and chills.   HENT:  Negative for congestion, sinus pressure and sore throat.    Respiratory:  Negative for chest tightness and shortness of breath.    Cardiovascular:  Negative for chest pain and palpitations.   Gastrointestinal:  Negative for diarrhea and nausea.   Musculoskeletal:  Negative for myalgias.   Neurological:  Negative for weakness.   Psychiatric/Behavioral:  Negative for agitation and behavioral problems.        Objective   /76 (BP Location: Left arm, Patient Position: Sitting, BP Cuff Size: Large adult)   Pulse 83   Temp 36 °C (96.8 °F) (Temporal)   Ht 1.829 m (6')   Wt 96.6 kg (213 lb)   SpO2 100%   BMI 28.89 kg/m²     Physical Exam  Constitutional:       Appearance: Normal appearance.   HENT:      Head: Normocephalic and atraumatic.   Cardiovascular:      Rate and Rhythm: Normal rate and regular rhythm.   Pulmonary:      Effort: No respiratory distress.      Breath sounds: No wheezing.   Abdominal:      General: Bowel sounds are normal.      Palpations: Abdomen is soft.   Musculoskeletal:      Comments: He has the classic swelling of his left elbow posteriorly.  It is nontender and not red and shows no sign of infection.   Neurological:      General: No focal deficit present.         Assessment/Plan   Assessment & Plan  Olecranon bursitis of left elbow  Patient advised to wrap the joint and follow its progress over time.       Atrioventricular block, complete (Multi)  His heart sounds to be in  normal sinus rhythm now.  Orders:    Lipid Panel Non-Fasting; Future    Beta thalassemia (Multi)  Noted and also stable.

## 2025-04-08 LAB
25(OH)D3+25(OH)D2 SERPL-MCNC: 34 NG/ML (ref 30–100)
ALBUMIN SERPL-MCNC: 4.5 G/DL (ref 3.6–5.1)
ALP SERPL-CCNC: 55 U/L (ref 35–144)
ALT SERPL-CCNC: 14 U/L (ref 9–46)
ANION GAP SERPL CALCULATED.4IONS-SCNC: 10 MMOL/L (CALC) (ref 7–17)
AST SERPL-CCNC: 17 U/L (ref 10–35)
BILIRUB SERPL-MCNC: 0.6 MG/DL (ref 0.2–1.2)
BUN SERPL-MCNC: 21 MG/DL (ref 7–25)
CALCIUM SERPL-MCNC: 9.5 MG/DL (ref 8.6–10.3)
CHLORIDE SERPL-SCNC: 101 MMOL/L (ref 98–110)
CHOLEST SERPL-MCNC: 132 MG/DL
CHOLEST/HDLC SERPL: 3.9 (CALC)
CO2 SERPL-SCNC: 28 MMOL/L (ref 20–32)
CREAT SERPL-MCNC: 1.27 MG/DL (ref 0.7–1.35)
EGFRCR SERPLBLD CKD-EPI 2021: 62 ML/MIN/1.73M2
GLUCOSE SERPL-MCNC: 97 MG/DL (ref 65–139)
HDLC SERPL-MCNC: 34 MG/DL
LDLC SERPL CALC-MCNC: 84 MG/DL (CALC)
NONHDLC SERPL-MCNC: 98 MG/DL (CALC)
POTASSIUM SERPL-SCNC: 3.6 MMOL/L (ref 3.5–5.3)
PROT SERPL-MCNC: 7.7 G/DL (ref 6.1–8.1)
SODIUM SERPL-SCNC: 139 MMOL/L (ref 135–146)
TRIGL SERPL-MCNC: 65 MG/DL

## 2025-04-10 ENCOUNTER — APPOINTMENT (OUTPATIENT)
Dept: UROLOGY | Facility: HOSPITAL | Age: 70
End: 2025-04-10
Payer: MEDICARE

## 2025-06-24 DIAGNOSIS — I10 ESSENTIAL (PRIMARY) HYPERTENSION: ICD-10-CM

## 2025-06-24 RX ORDER — LISINOPRIL AND HYDROCHLOROTHIAZIDE 20; 25 MG/1; MG/1
1 TABLET ORAL DAILY
Qty: 90 TABLET | Refills: 2 | Status: SHIPPED | OUTPATIENT
Start: 2025-06-24

## 2025-06-27 DIAGNOSIS — R97.20 ELEVATED PSA: ICD-10-CM

## 2025-07-15 LAB
PSA FREE MFR SERPL: 36 % (CALC)
PSA FREE SERPL-MCNC: 1 NG/ML
PSA SERPL-MCNC: 2.8 NG/ML

## 2025-07-19 DIAGNOSIS — E78.5 HYPERLIPIDEMIA, UNSPECIFIED: ICD-10-CM

## 2025-07-21 RX ORDER — ATORVASTATIN CALCIUM 20 MG/1
20 TABLET, FILM COATED ORAL DAILY
Qty: 90 TABLET | Refills: 3 | Status: SHIPPED | OUTPATIENT
Start: 2025-07-21

## 2025-10-09 ENCOUNTER — APPOINTMENT (OUTPATIENT)
Dept: UROLOGY | Facility: HOSPITAL | Age: 70
End: 2025-10-09
Payer: MEDICARE

## 2025-11-24 ENCOUNTER — APPOINTMENT (OUTPATIENT)
Dept: PRIMARY CARE | Facility: HOSPITAL | Age: 70
End: 2025-11-24
Payer: MEDICARE